# Patient Record
Sex: FEMALE | Race: WHITE | NOT HISPANIC OR LATINO
[De-identification: names, ages, dates, MRNs, and addresses within clinical notes are randomized per-mention and may not be internally consistent; named-entity substitution may affect disease eponyms.]

---

## 2017-03-03 ENCOUNTER — APPOINTMENT (OUTPATIENT)
Dept: RHEUMATOLOGY | Facility: CLINIC | Age: 52
End: 2017-03-03

## 2017-06-16 ENCOUNTER — APPOINTMENT (OUTPATIENT)
Dept: RHEUMATOLOGY | Facility: CLINIC | Age: 52
End: 2017-06-16

## 2017-06-16 VITALS
DIASTOLIC BLOOD PRESSURE: 82 MMHG | OXYGEN SATURATION: 98 % | TEMPERATURE: 98.6 F | HEART RATE: 72 BPM | HEIGHT: 63 IN | WEIGHT: 151 LBS | SYSTOLIC BLOOD PRESSURE: 136 MMHG | BODY MASS INDEX: 26.75 KG/M2

## 2017-07-28 ENCOUNTER — APPOINTMENT (OUTPATIENT)
Dept: INTERNAL MEDICINE | Facility: CLINIC | Age: 52
End: 2017-07-28
Payer: COMMERCIAL

## 2017-07-28 VITALS
OXYGEN SATURATION: 95 % | HEIGHT: 63 IN | RESPIRATION RATE: 18 BRPM | BODY MASS INDEX: 26.22 KG/M2 | TEMPERATURE: 98.4 F | WEIGHT: 148 LBS | SYSTOLIC BLOOD PRESSURE: 144 MMHG | HEART RATE: 76 BPM | DIASTOLIC BLOOD PRESSURE: 76 MMHG

## 2017-07-28 PROCEDURE — 99204 OFFICE O/P NEW MOD 45 MIN: CPT | Mod: 25

## 2017-07-28 PROCEDURE — 94060 EVALUATION OF WHEEZING: CPT

## 2017-07-28 PROCEDURE — 94727 GAS DIL/WSHOT DETER LNG VOL: CPT

## 2017-07-28 PROCEDURE — ZZZZZ: CPT

## 2017-07-28 PROCEDURE — 94729 DIFFUSING CAPACITY: CPT

## 2017-09-27 ENCOUNTER — RX RENEWAL (OUTPATIENT)
Age: 52
End: 2017-09-27

## 2017-11-17 ENCOUNTER — APPOINTMENT (OUTPATIENT)
Dept: RHEUMATOLOGY | Facility: CLINIC | Age: 52
End: 2017-11-17
Payer: COMMERCIAL

## 2017-11-17 VITALS
HEART RATE: 76 BPM | HEIGHT: 63 IN | SYSTOLIC BLOOD PRESSURE: 151 MMHG | WEIGHT: 152 LBS | BODY MASS INDEX: 26.93 KG/M2 | TEMPERATURE: 98.6 F | DIASTOLIC BLOOD PRESSURE: 93 MMHG | OXYGEN SATURATION: 97 %

## 2017-11-17 VITALS — DIASTOLIC BLOOD PRESSURE: 78 MMHG | SYSTOLIC BLOOD PRESSURE: 116 MMHG

## 2017-11-17 PROCEDURE — 99214 OFFICE O/P EST MOD 30 MIN: CPT | Mod: 25

## 2017-11-17 PROCEDURE — G0008: CPT

## 2017-11-17 PROCEDURE — 90688 IIV4 VACCINE SPLT 0.5 ML IM: CPT

## 2017-11-17 RX ORDER — PREDNISONE 20 MG/1
20 TABLET ORAL DAILY
Qty: 60 | Refills: 1 | Status: DISCONTINUED | COMMUNITY
Start: 2017-08-01 | End: 2017-11-17

## 2017-11-17 RX ORDER — SULFAMETHOXAZOLE AND TRIMETHOPRIM 800; 160 MG/1; MG/1
800-160 TABLET ORAL DAILY
Qty: 10 | Refills: 1 | Status: DISCONTINUED | COMMUNITY
Start: 2017-08-01 | End: 2017-11-17

## 2018-04-18 ENCOUNTER — OTHER (OUTPATIENT)
Age: 53
End: 2018-04-18

## 2018-08-13 ENCOUNTER — APPOINTMENT (OUTPATIENT)
Dept: NEUROSURGERY | Facility: CLINIC | Age: 53
End: 2018-08-13
Payer: COMMERCIAL

## 2018-08-13 VITALS
DIASTOLIC BLOOD PRESSURE: 67 MMHG | SYSTOLIC BLOOD PRESSURE: 128 MMHG | HEIGHT: 63.39 IN | BODY MASS INDEX: 25.9 KG/M2 | RESPIRATION RATE: 18 BRPM | TEMPERATURE: 98.6 F | WEIGHT: 148 LBS | HEART RATE: 49 BPM

## 2018-08-13 DIAGNOSIS — E34.8 OTHER SPECIFIED ENDOCRINE DISORDERS: ICD-10-CM

## 2018-08-13 PROCEDURE — 99213 OFFICE O/P EST LOW 20 MIN: CPT

## 2018-08-13 RX ORDER — PREDNISONE 1 MG/1
1 TABLET ORAL
Qty: 360 | Refills: 3 | Status: DISCONTINUED | COMMUNITY
Start: 2017-11-17 | End: 2018-08-13

## 2018-08-13 RX ORDER — FLUTICASONE PROPIONATE AND SALMETEROL 50; 250 UG/1; UG/1
250-50 POWDER RESPIRATORY (INHALATION)
Qty: 5 | Refills: 0 | Status: DISCONTINUED | COMMUNITY
Start: 2017-07-28 | End: 2018-08-13

## 2018-09-10 ENCOUNTER — RX RENEWAL (OUTPATIENT)
Age: 53
End: 2018-09-10

## 2018-09-20 ENCOUNTER — APPOINTMENT (OUTPATIENT)
Dept: BREAST CENTER | Facility: CLINIC | Age: 53
End: 2018-09-20
Payer: COMMERCIAL

## 2018-09-20 VITALS
BODY MASS INDEX: 26.58 KG/M2 | HEART RATE: 67 BPM | SYSTOLIC BLOOD PRESSURE: 140 MMHG | DIASTOLIC BLOOD PRESSURE: 90 MMHG | WEIGHT: 150 LBS | HEIGHT: 63 IN

## 2018-09-20 DIAGNOSIS — Z78.9 OTHER SPECIFIED HEALTH STATUS: ICD-10-CM

## 2018-09-20 DIAGNOSIS — N64.52 NIPPLE DISCHARGE: ICD-10-CM

## 2018-09-20 DIAGNOSIS — Z80.52 FAMILY HISTORY OF MALIGNANT NEOPLASM OF BLADDER: ICD-10-CM

## 2018-09-20 DIAGNOSIS — Z83.3 FAMILY HISTORY OF DIABETES MELLITUS: ICD-10-CM

## 2018-09-20 DIAGNOSIS — Z86.59 PERSONAL HISTORY OF OTHER MENTAL AND BEHAVIORAL DISORDERS: ICD-10-CM

## 2018-09-20 DIAGNOSIS — Z87.39 PERSONAL HISTORY OF OTHER DISEASES OF THE MUSCULOSKELETAL SYSTEM AND CONNECTIVE TISSUE: ICD-10-CM

## 2018-09-20 DIAGNOSIS — Z83.79 FAMILY HISTORY OF OTHER DISEASES OF THE DIGESTIVE SYSTEM: ICD-10-CM

## 2018-09-20 DIAGNOSIS — Z80.3 FAMILY HISTORY OF MALIGNANT NEOPLASM OF BREAST: ICD-10-CM

## 2018-09-20 DIAGNOSIS — Z80.0 FAMILY HISTORY OF MALIGNANT NEOPLASM OF DIGESTIVE ORGANS: ICD-10-CM

## 2018-09-20 PROCEDURE — 99243 OFF/OP CNSLTJ NEW/EST LOW 30: CPT

## 2018-09-21 PROBLEM — Z78.9 CAFFEINE USE: Status: ACTIVE | Noted: 2018-09-20

## 2018-09-21 PROBLEM — Z83.3 FAMILY HISTORY OF DIABETES MELLITUS: Status: ACTIVE | Noted: 2018-09-20

## 2018-09-21 PROBLEM — Z83.79 FAMILY HISTORY OF ULCERATIVE COLITIS: Status: ACTIVE | Noted: 2018-09-20

## 2018-09-21 PROBLEM — Z80.52 FAMILY HISTORY OF MALIGNANT NEOPLASM OF URINARY BLADDER: Status: ACTIVE | Noted: 2018-09-20

## 2018-09-21 PROBLEM — Z80.0 FAMILY HISTORY OF COLON CANCER: Status: ACTIVE | Noted: 2018-09-20

## 2018-09-21 PROBLEM — Z86.59 HISTORY OF DEPRESSION: Status: RESOLVED | Noted: 2018-09-20 | Resolved: 2018-09-21

## 2018-09-21 PROBLEM — Z80.3 FAMILY HISTORY OF BREAST CANCER: Status: ACTIVE | Noted: 2018-09-20

## 2018-09-21 PROBLEM — Z87.39 HISTORY OF SYSTEMIC LUPUS ERYTHEMATOSUS (SLE): Status: RESOLVED | Noted: 2018-09-20 | Resolved: 2018-09-21

## 2018-11-02 ENCOUNTER — RX RENEWAL (OUTPATIENT)
Age: 53
End: 2018-11-02

## 2018-12-06 ENCOUNTER — APPOINTMENT (OUTPATIENT)
Dept: BREAST CENTER | Facility: CLINIC | Age: 53
End: 2018-12-06
Payer: COMMERCIAL

## 2018-12-06 PROCEDURE — 99214 OFFICE O/P EST MOD 30 MIN: CPT

## 2018-12-11 ENCOUNTER — OUTPATIENT (OUTPATIENT)
Dept: OUTPATIENT SERVICES | Facility: HOSPITAL | Age: 53
LOS: 1 days | End: 2018-12-11

## 2018-12-11 DIAGNOSIS — Z00.8 ENCOUNTER FOR OTHER GENERAL EXAMINATION: ICD-10-CM

## 2018-12-12 ENCOUNTER — OUTPATIENT (OUTPATIENT)
Dept: OUTPATIENT SERVICES | Facility: HOSPITAL | Age: 53
LOS: 1 days | Discharge: HOME | End: 2018-12-12
Payer: COMMERCIAL

## 2018-12-12 ENCOUNTER — RESULT REVIEW (OUTPATIENT)
Age: 53
End: 2018-12-12

## 2018-12-12 DIAGNOSIS — D24.1 BENIGN NEOPLASM OF RIGHT BREAST: ICD-10-CM

## 2018-12-12 PROCEDURE — 88321 CONSLTJ&REPRT SLD PREP ELSWR: CPT

## 2018-12-18 LAB — SURGICAL PATHOLOGY FINAL REPORT - CH: SIGNIFICANT CHANGE UP

## 2019-01-27 ENCOUNTER — FORM ENCOUNTER (OUTPATIENT)
Age: 54
End: 2019-01-27

## 2019-01-28 ENCOUNTER — APPOINTMENT (OUTPATIENT)
Dept: MAMMOGRAPHY | Facility: CLINIC | Age: 54
End: 2019-01-28
Payer: COMMERCIAL

## 2019-01-28 ENCOUNTER — OUTPATIENT (OUTPATIENT)
Dept: OUTPATIENT SERVICES | Facility: HOSPITAL | Age: 54
LOS: 1 days | End: 2019-01-28
Payer: COMMERCIAL

## 2019-01-28 ENCOUNTER — APPOINTMENT (OUTPATIENT)
Dept: INTERNAL MEDICINE | Facility: CLINIC | Age: 54
End: 2019-01-28
Payer: COMMERCIAL

## 2019-01-28 VITALS
SYSTOLIC BLOOD PRESSURE: 120 MMHG | BODY MASS INDEX: 26.22 KG/M2 | RESPIRATION RATE: 16 BRPM | TEMPERATURE: 98.8 F | HEART RATE: 84 BPM | DIASTOLIC BLOOD PRESSURE: 66 MMHG | HEIGHT: 63 IN | OXYGEN SATURATION: 99 % | WEIGHT: 148 LBS

## 2019-01-28 DIAGNOSIS — Z00.8 ENCOUNTER FOR OTHER GENERAL EXAMINATION: ICD-10-CM

## 2019-01-28 DIAGNOSIS — R92.8 OTHER ABNORMAL AND INCONCLUSIVE FINDINGS ON DIAGNOSTIC IMAGING OF BREAST: ICD-10-CM

## 2019-01-28 PROCEDURE — 94729 DIFFUSING CAPACITY: CPT

## 2019-01-28 PROCEDURE — 99215 OFFICE O/P EST HI 40 MIN: CPT | Mod: 25

## 2019-01-28 PROCEDURE — 94060 EVALUATION OF WHEEZING: CPT

## 2019-01-28 PROCEDURE — C1739: CPT

## 2019-01-28 PROCEDURE — 19281 PERQ DEVICE BREAST 1ST IMAG: CPT

## 2019-01-28 PROCEDURE — 19281 PERQ DEVICE BREAST 1ST IMAG: CPT | Mod: RT

## 2019-01-28 PROCEDURE — ZZZZZ: CPT

## 2019-01-28 PROCEDURE — 94727 GAS DIL/WSHOT DETER LNG VOL: CPT

## 2019-01-28 NOTE — ASSESSMENT
[FreeTextEntry1] : Ms. Spann is scheduled for elective right breast papilloma excision. She has a significant smoking history, however, complete pulmonary function tests show no evidence of obstructive lung disease. A previous CT scan of chest shows mild upper lobe emphysematous changes. There is no contraindication to planned procedure with anesthesia/sedation. Patient will followup with me on a yearly basis.

## 2019-01-28 NOTE — HISTORY OF PRESENT ILLNESS
[FreeTextEntry1] : Patient presents for preoperative pulmonary evaluation for scheduled right breast lumpectomy. Patient was found to have an intraductal papilloma of the right breast. At that she has a 40+ pack year smoking history. Previous CT scans have demonstrated a 4 mm right lobe nodule which has been unchanged since 2012. Ms. Spann has some shortness of breath with exertion however she has no nocturnal symptoms of cough or dyspnea. She remains an active smoker. There is normal axis, anorexia or weight loss.

## 2019-01-28 NOTE — PROCEDURE
[FreeTextEntry1] : Complete pulmonary function tests show normal spirometry, lung volumes and flow volume loop. Diffusing capacity is normal.

## 2019-02-04 ENCOUNTER — MESSAGE (OUTPATIENT)
Age: 54
End: 2019-02-04

## 2019-02-04 NOTE — DATA REVIEWED
[FreeTextEntry1] : Mammogram:     4/26/18     Findings:  Negative\par \par Bilateral breast ultrasound:  4/26/18   Findings:  No findings in the right breast.  Stable 7 mm intrammary lymph node at 3:00 N10. \par \par Breast MRI:  10/13/18   Findings:  7 mm oval enhancing nodule at the 6:00 axis. \par \par MRI guided core biopsy Right breast 6:00:  11/9/18    PATHOLOGY:   Sclerosed intraductal papilloma.\par

## 2019-02-04 NOTE — HISTORY OF PRESENT ILLNESS
[FreeTextEntry1] : 53 year old female initially presented on 9/20/18 with a 1 year history of a right nipple discharge.\par Work up including mammography, ultrasound and breast MRI revealed a 7 mm enhancing nodule in the right retroareolar region.  MRI biopsy confirmed the diagnosis of an intraductal papilloma.  The patient is being admitted to [facility] for a right excisional breast biopsy with Radha localization.

## 2019-02-06 ENCOUNTER — RESULT REVIEW (OUTPATIENT)
Age: 54
End: 2019-02-06

## 2019-02-06 ENCOUNTER — OUTPATIENT (OUTPATIENT)
Dept: OUTPATIENT SERVICES | Facility: HOSPITAL | Age: 54
LOS: 1 days | Discharge: HOME | End: 2019-02-06
Payer: COMMERCIAL

## 2019-02-06 ENCOUNTER — APPOINTMENT (OUTPATIENT)
Dept: BREAST CENTER | Facility: HOSPITAL | Age: 54
End: 2019-02-06
Payer: COMMERCIAL

## 2019-02-06 VITALS
DIASTOLIC BLOOD PRESSURE: 81 MMHG | HEIGHT: 63 IN | OXYGEN SATURATION: 98 % | TEMPERATURE: 98 F | HEART RATE: 65 BPM | RESPIRATION RATE: 14 BRPM | WEIGHT: 150.36 LBS | SYSTOLIC BLOOD PRESSURE: 134 MMHG

## 2019-02-06 VITALS
RESPIRATION RATE: 14 BRPM | TEMPERATURE: 98 F | HEART RATE: 61 BPM | SYSTOLIC BLOOD PRESSURE: 102 MMHG | OXYGEN SATURATION: 96 % | DIASTOLIC BLOOD PRESSURE: 58 MMHG

## 2019-02-06 DIAGNOSIS — J34.2 DEVIATED NASAL SEPTUM: Chronic | ICD-10-CM

## 2019-02-06 PROCEDURE — 76098 X-RAY EXAM SURGICAL SPECIMEN: CPT | Mod: 26

## 2019-02-06 PROCEDURE — 19125 EXCISION BREAST LESION: CPT

## 2019-02-06 PROCEDURE — 88307 TISSUE EXAM BY PATHOLOGIST: CPT | Mod: 26

## 2019-02-06 RX ORDER — IODOQUINOL, HYDROCORTISONE ACETATE AND ALOE VERA LEAF 10; 20; 10 MG/G; MG/G; MG/G
1 GEL TOPICAL
Qty: 0 | Refills: 0 | COMMUNITY

## 2019-02-06 RX ORDER — TRAZODONE HCL 50 MG
0 TABLET ORAL
Qty: 0 | Refills: 0 | COMMUNITY

## 2019-02-06 RX ORDER — VENLAFAXINE HCL 75 MG
1 CAPSULE, EXT RELEASE 24 HR ORAL
Qty: 0 | Refills: 0 | COMMUNITY

## 2019-02-06 RX ORDER — SODIUM CHLORIDE 9 MG/ML
1000 INJECTION, SOLUTION INTRAVENOUS
Qty: 0 | Refills: 0 | Status: DISCONTINUED | OUTPATIENT
Start: 2019-02-06 | End: 2019-02-06

## 2019-02-06 RX ORDER — MYCOPHENOLATE MOFETIL 250 MG/1
1000 CAPSULE ORAL
Qty: 0 | Refills: 0 | COMMUNITY

## 2019-02-06 RX ORDER — FENTANYL CITRATE 50 UG/ML
50 INJECTION INTRAVENOUS
Qty: 0 | Refills: 0 | Status: DISCONTINUED | OUTPATIENT
Start: 2019-02-06 | End: 2019-02-06

## 2019-02-06 RX ORDER — OXYCODONE HYDROCHLORIDE 5 MG/1
10 TABLET ORAL ONCE
Qty: 0 | Refills: 0 | Status: DISCONTINUED | OUTPATIENT
Start: 2019-02-06 | End: 2019-02-06

## 2019-02-06 RX ORDER — OXYCODONE HYDROCHLORIDE 5 MG/1
1 TABLET ORAL
Qty: 20 | Refills: 0 | OUTPATIENT
Start: 2019-02-06

## 2019-02-06 RX ORDER — OXYCODONE HYDROCHLORIDE 5 MG/1
5 TABLET ORAL ONCE
Qty: 0 | Refills: 0 | Status: DISCONTINUED | OUTPATIENT
Start: 2019-02-06 | End: 2019-02-06

## 2019-02-06 RX ORDER — ONDANSETRON 8 MG/1
4 TABLET, FILM COATED ORAL ONCE
Qty: 0 | Refills: 0 | Status: DISCONTINUED | OUTPATIENT
Start: 2019-02-06 | End: 2019-02-06

## 2019-02-06 RX ORDER — L.ACIDOPH/B.ANIMALIS/B.LONGUM 15B CELL
1 CAPSULE ORAL
Qty: 0 | Refills: 0 | COMMUNITY

## 2019-02-06 RX ADMIN — OXYCODONE HYDROCHLORIDE 5 MILLIGRAM(S): 5 TABLET ORAL at 11:09

## 2019-02-06 NOTE — ASU PATIENT PROFILE, ADULT - PMH
COPD, severity to be determined    Intraductal cancer of right breast    Lupus (systemic lupus erythematosus)    Raynaud disease

## 2019-02-06 NOTE — ASU DISCHARGE PLAN (ADULT/PEDIATRIC). - MEDICATION SUMMARY - MEDICATIONS TO STOP TAKING
I will STOP taking the medications listed below when I get home from the hospital:    CellCept  -- 1000 milligram(s) by mouth once a day

## 2019-02-06 NOTE — ASU DISCHARGE PLAN (ADULT/PEDIATRIC). - MEDICATION SUMMARY - MEDICATIONS TO TAKE
I will START or STAY ON the medications listed below when I get home from the hospital:    predniSONE 5 mg oral tablet  -- 1 tab(s) by mouth once a day  -- Indication: For home med    oxyCODONE 5 mg oral tablet  -- 1 -2 tab(s) by mouth every 4 hours, As Needed -for moderate pain MDD:12  -- Caution federal law prohibits the transfer of this drug to any person other  than the person for whom it was prescribed.  It is very important that you take or use this exactly as directed.  Do not skip doses or discontinue unless directed by your doctor.  May cause drowsiness.  Alcohol may intensify this effect.  Use care when operating dangerous machinery.  This prescription cannot be refilled.  Using more of this medication than prescribed may cause serious breathing problems.    -- Indication: For home med    traZODone 100 mg oral tablet  -- orally once a day (at bedtime)  -- Indication: For home med    Effexor 75 mg oral tablet  -- 1 tab(s) by mouth once a day  -- Indication: For home med    Alcortin A 1%-2%-1% topical gel  -- Apply on skin to affected area once a day  -- Indication: For home med    Probiotic Formula oral capsule  -- 1 cap(s) by mouth once a day  -- Indication: For home med    Bactrim 400 mg-80 mg oral tablet  -- 1 tab(s) by mouth 2 times a day   -- Avoid prolonged or excessive exposure to direct and/or artificial sunlight while taking this medication.  Finish all this medication unless otherwise directed by prescriber.  Medication should be taken with plenty of water.    -- Indication: For home med    Calcium 500+D oral tablet, chewable  -- 1 tab(s) by mouth once a day  -- Indication: For home med

## 2019-02-06 NOTE — ASU PATIENT PROFILE, ADULT - TEACHING/LEARNING FACTORS INFLUENCE READINESS TO LEARN
Vaccine Information Statement(s) was given today. This has been reviewed, questions answered, and verbal consent given by Parent for injection(s) and administration of Haemophilus Influenza type b (Hib), Pediarix, Pneumococcal Conjugate (PCV13) and Rotavirus.      Patient tolerated without incident. See immunization grid for documentation.     depression

## 2019-02-06 NOTE — BRIEF OPERATIVE NOTE - PROCEDURE
<<-----Click on this checkbox to enter Procedure Excisional breast biopsy with radiologic localization  02/06/2019  Radha localizatiion  Active  JEANETH

## 2019-02-08 ENCOUNTER — APPOINTMENT (OUTPATIENT)
Dept: RHEUMATOLOGY | Facility: CLINIC | Age: 54
End: 2019-02-08
Payer: COMMERCIAL

## 2019-02-08 VITALS
BODY MASS INDEX: 26.75 KG/M2 | SYSTOLIC BLOOD PRESSURE: 136 MMHG | OXYGEN SATURATION: 99 % | HEIGHT: 63 IN | DIASTOLIC BLOOD PRESSURE: 84 MMHG | HEART RATE: 63 BPM | WEIGHT: 151 LBS

## 2019-02-08 PROCEDURE — 99214 OFFICE O/P EST MOD 30 MIN: CPT

## 2019-02-08 RX ORDER — MYCOPHENOLATE MOFETIL 500 MG/1
TABLET, FILM COATED ORAL
Refills: 0 | Status: DISCONTINUED | COMMUNITY
End: 2019-02-08

## 2019-02-08 RX ORDER — VENLAFAXINE HCL 50 MG
TABLET ORAL
Refills: 0 | Status: DISCONTINUED | COMMUNITY
End: 2019-02-08

## 2019-02-08 NOTE — DATA REVIEWED
[FreeTextEntry1] : Imaging: \par - updated echo 5/18 mild PAs 36\par - updated CTscan chest 4/18 mild emphysema and 0.4 cm nodule- both stable with no evidence of ILD\par - updated PFTs w/ DLCO excellent \par - dx w/ sclerosed intraductal papiloma presented w/ R nipple discharge 1 yr ago... lumpectomy few days ago, thus far doing well, awaiting path. \par HRCT 7/17/17 emphysema predominantly in apical/ paraseptal... mild peripheral pulm fibrosis w/ out honeycombing.. stable from 2016 study\par CTScan 5/21/16 stable emphysema w/ progressed ILD\par CTscan 9/14 stable emphysema w/ subtle fibrotic changes- stable, should be repeated\par Echo 3/13:  fine, repeat 3/15 stable\par PFTs 7/17 "aced" despite continued smoking 1PPD- 2PPD (not available for review)\par \par Brain MRI for severe transient HA neg 3/16

## 2019-02-08 NOTE — HISTORY OF PRESENT ILLNESS
[FreeTextEntry1] : 2/8/18\par - when feeling well, no physical limitations, no stiffness, clear cognition and rash controlled better 1500 mg MMF  and 8 mg pred...  was feeling well till scheduled for lumpectomy and tapered down to 5 mg pred... now... also off MMF for past 2 wks.  Joint pain, fatigue, and rash all considerably worse.  \par - updated echo 5/18 mild PAs 36\par - updated CTscan chest 4/18 mild emphysema and 0.4 cm nodule- both stable with no evidence of ILD\par - updated PFTs w/ DLCO excellent \par - dx w/ sclerosed intraductal papiloma presented w/ R nipple discharge 1 yr ago... lumpectomy few days ago, thus far doing well, awaiting path. \par - tapered down venlaxifine to 75 mg and tolerated well\par - Trazodone continues at 100-150 qhs\par - still smoking > PPD for past 30+ ys with no interest in stopping.  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

## 2019-02-08 NOTE — PHYSICAL EXAM
[General Appearance - Alert] : alert [General Appearance - In No Acute Distress] : in no acute distress [General Appearance - Well Nourished] : well nourished [General Appearance - Well Developed] : well developed [General Appearance - Well-Appearing] : healthy appearing [PERRL With Normal Accommodation] : pupils were equal in size, round, and reactive to light [Extraocular Movements] : extraocular movements were intact [Outer Ear] : the ears and nose were normal in appearance [Thyroid Diffuse Enlargement] : the thyroid was not enlarged [Thyroid Nodule] : there were no palpable thyroid nodules [Auscultation Breath Sounds / Voice Sounds] : lungs were clear to auscultation bilaterally [Heart Rate And Rhythm] : heart rate was normal and rhythm regular [Heart Sounds] : normal S1 and S2 [Heart Sounds Gallop] : no gallops [Murmurs] : no murmurs [Heart Sounds Pericardial Friction Rub] : no pericardial rub [Edema] : there was no peripheral edema [Cervical Lymph Nodes Enlarged Posterior Bilaterally] : posterior cervical [Cervical Lymph Nodes Enlarged Anterior Bilaterally] : anterior cervical [Supraclavicular Lymph Nodes Enlarged Bilaterally] : supraclavicular [Abnormal Walk] : normal gait [Nail Clubbing] : no clubbing  or cyanosis of the fingernails [Musculoskeletal - Swelling] : no joint swelling seen [Motor Tone] : muscle strength and tone were normal [Skin Color & Pigmentation] : normal skin color and pigmentation [Skin Turgor] : normal skin turgor [] : no rash [Sensation] : the sensory exam was normal to light touch and pinprick [Motor Exam] : the motor exam was normal [No Focal Deficits] : no focal deficits [Oriented To Time, Place, And Person] : oriented to person, place, and time [Impaired Insight] : insight and judgment were intact [Affect] : the affect was normal [Oropharynx] : the oropharynx was normal [No CVA Tenderness] : no ~M costovertebral angle tenderness [No Spinal Tenderness] : no spinal tenderness [FreeTextEntry1] :  calm, appropriate, pleasant PHQ score 9- mild

## 2019-02-08 NOTE — ASSESSMENT
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 Decreased to 5 mg in preparation for lumpectomy- and held MMF for past 10 days- noted return of rash already.  \par  Initially noted nausea w/ MMF and has resisted tapering off steroids.  Will increase MMF to 3 gm now and try to keep pred at 5 mg \par No myositis now or in past\par Most recent w/u CTScan chest 5/18 stable mild emphysema and 0.4 cm nodule but no ILD/ PFTs repeatedly nl with no obstructive/ restrictive change and echo 4/18 PAS 36. \par May have FLOR:  does snore... but no study.  If overwhelming fatigue persists may consider eval especially given otherwise good control. \par NOTE: \par - elixer MMF not better tolerated \par - Failed to control w/ HCQ alone on low dose steroids x many ys, added Benlysta, tapered off steroids but after 1 yr, refused to continue- was not necessarily better. \par 2014 With dx DM added MMF but never increased to effective dose as requested over ys \par \par 2) Depression/ 26 ys Clean and Sober:  intermittently struggles w/ feelings of depression but no suicidal ideations-better past yr and decreased venlaxifine to 75 mg once daily.\par Fatigue chronically, worse when SLE not fully controlled but may also have FLOR\par still in happy/ healthy relationship w/ strong social supports\par \par \par 3)CV risk: high given longstanding heavy smoker, chronic inflammatory state (SLE) and long term use Steroids w/ \par  HLD:  persistently elevated \par tchol 274\par Trigy  209\par HDL  60\par [id number]\par last year and again 4/17 was supposed to start statin, but did not. \par Noted plaque formation on carotid studies, echo suggested ischemic change but stress test neg.  Cath was not done and continues to deny palpitations, cp, sob, rosas... despite extensive review subtle sx in females.. still denies.\par Each visit discussed long standing smoking and has no interest in stopping  \par \par 4) Non compliance:  repeatedly as noted over years... f/u infrequent ov, failed to secure PCP- still - studies not done as requested. \par  Remain concerned about multiple risk factors for CVD, emphasized this today and last visit with pt and partner... but over time little changes.  \par Admits depression is a factor.. but doesn't feel this is completely the reason.  \par \par 5) Osteopenia: \par needs updated studies now repeatedly requested and again not done\par  \par Vaccination: \par - flushot 10/15, 10/17, 11/18 \par - Pneumonia 23 4 ys ago\par - Prevnar 13 needed\par \par Plan:\par - labs middle of March \par \par - increase Cellcept (MMF)  to 1500 mg twice daily, call if any issues tolerating this dose. Don't restart MMF till you see the surgeon. \par \par - labs 2-4 wks after starting.. \par \par - check with me for results of labs\par \par - need a primary care:  Call Catalina Clemens  [phone].  If you have trouble getting appt let me know \par \par - would like appt in June to followup ... Need to see how you are doing on the MMF\par . \par \par

## 2019-02-09 PROBLEM — D05.11 INTRADUCTAL CARCINOMA IN SITU OF RIGHT BREAST: Chronic | Status: ACTIVE | Noted: 2019-02-06

## 2019-02-09 PROBLEM — M32.9 SYSTEMIC LUPUS ERYTHEMATOSUS, UNSPECIFIED: Chronic | Status: ACTIVE | Noted: 2019-02-06

## 2019-02-09 PROBLEM — J44.9 CHRONIC OBSTRUCTIVE PULMONARY DISEASE, UNSPECIFIED: Chronic | Status: ACTIVE | Noted: 2019-02-06

## 2019-02-09 PROBLEM — I73.00 RAYNAUD'S SYNDROME WITHOUT GANGRENE: Chronic | Status: ACTIVE | Noted: 2019-02-06

## 2019-02-11 LAB — SURGICAL PATHOLOGY FINAL REPORT - CH: SIGNIFICANT CHANGE UP

## 2019-02-13 DIAGNOSIS — I73.00 RAYNAUD'S SYNDROME WITHOUT GANGRENE: ICD-10-CM

## 2019-02-13 DIAGNOSIS — Z88.0 ALLERGY STATUS TO PENICILLIN: ICD-10-CM

## 2019-02-13 DIAGNOSIS — D24.1 BENIGN NEOPLASM OF RIGHT BREAST: ICD-10-CM

## 2019-02-13 DIAGNOSIS — Z83.3 FAMILY HISTORY OF DIABETES MELLITUS: ICD-10-CM

## 2019-02-13 DIAGNOSIS — F10.21 ALCOHOL DEPENDENCE, IN REMISSION: ICD-10-CM

## 2019-02-13 DIAGNOSIS — F32.9 MAJOR DEPRESSIVE DISORDER, SINGLE EPISODE, UNSPECIFIED: ICD-10-CM

## 2019-02-13 DIAGNOSIS — Z80.0 FAMILY HISTORY OF MALIGNANT NEOPLASM OF DIGESTIVE ORGANS: ICD-10-CM

## 2019-02-13 DIAGNOSIS — N64.89 OTHER SPECIFIED DISORDERS OF BREAST: ICD-10-CM

## 2019-02-13 DIAGNOSIS — M32.9 SYSTEMIC LUPUS ERYTHEMATOSUS, UNSPECIFIED: ICD-10-CM

## 2019-02-13 DIAGNOSIS — F17.210 NICOTINE DEPENDENCE, CIGARETTES, UNCOMPLICATED: ICD-10-CM

## 2019-02-13 DIAGNOSIS — M85.80 OTHER SPECIFIED DISORDERS OF BONE DENSITY AND STRUCTURE, UNSPECIFIED SITE: ICD-10-CM

## 2019-02-13 DIAGNOSIS — J44.9 CHRONIC OBSTRUCTIVE PULMONARY DISEASE, UNSPECIFIED: ICD-10-CM

## 2019-02-13 DIAGNOSIS — Z80.3 FAMILY HISTORY OF MALIGNANT NEOPLASM OF BREAST: ICD-10-CM

## 2019-02-14 ENCOUNTER — APPOINTMENT (OUTPATIENT)
Dept: BREAST CENTER | Facility: CLINIC | Age: 54
End: 2019-02-14
Payer: COMMERCIAL

## 2019-02-14 DIAGNOSIS — D24.1 BENIGN NEOPLASM OF RIGHT BREAST: ICD-10-CM

## 2019-02-14 PROCEDURE — 99024 POSTOP FOLLOW-UP VISIT: CPT

## 2019-02-14 NOTE — CONSULT LETTER
[Dear  ___] : Dear ~EDWIN, [Courtesy Letter:] : I had the pleasure of seeing your patient, [unfilled], in my office today. [Please see my note below.] : Please see my note below. [Sincerely,] : Sincerely, [FreeTextEntry2] : Blu Villalta MD [FreeTextEntry3] : Kaitlin Reyes MD FACS\par  [DrAdelina  ___] : Dr. REID

## 2019-02-14 NOTE — HISTORY OF PRESENT ILLNESS
[FreeTextEntry1] : 53 year old female underwent  a right excisional breast biopsy with Radha localization  on 2/6/19 for an intraductal papilloma diagnosed on core biopsy. Additionally she had a right nipple discharge.

## 2019-02-14 NOTE — DATA REVIEWED
[FreeTextEntry1] : PATHOLOGY: 2/6/19  Findings: Right breast-  Previous biopsy site identified. No residual papilloma. No malignancy.

## 2019-07-10 ENCOUNTER — RX RENEWAL (OUTPATIENT)
Age: 54
End: 2019-07-10

## 2019-08-02 ENCOUNTER — RX RENEWAL (OUTPATIENT)
Age: 54
End: 2019-08-02

## 2019-09-03 ENCOUNTER — APPOINTMENT (OUTPATIENT)
Dept: RHEUMATOLOGY | Facility: CLINIC | Age: 54
End: 2019-09-03
Payer: COMMERCIAL

## 2019-09-03 VITALS
HEIGHT: 63 IN | BODY MASS INDEX: 25.16 KG/M2 | DIASTOLIC BLOOD PRESSURE: 78 MMHG | HEART RATE: 70 BPM | OXYGEN SATURATION: 98 % | RESPIRATION RATE: 16 BRPM | WEIGHT: 142 LBS | TEMPERATURE: 98.6 F | SYSTOLIC BLOOD PRESSURE: 130 MMHG

## 2019-09-03 DIAGNOSIS — Z71.6 TOBACCO ABUSE COUNSELING: ICD-10-CM

## 2019-09-03 PROCEDURE — 99214 OFFICE O/P EST MOD 30 MIN: CPT

## 2019-09-03 NOTE — HISTORY OF PRESENT ILLNESS
[FreeTextEntry1] : 9/3/19\par - MMF only increased to FS and increased prednisone 20 mg and decrease now to 15 mg and feeling well\par - trazodone 200 mg and finally sleeping better... less exhaustion recently... waking prior to the higher dose trazodone waking every 1-2 hs... \par - effexor 75 mg now for past 2 ys...\par - no joint inflammation, denies muscle weakness\par - mild rosas but quickly resolves, increase in productive / congested cough- light tan- intermittent wheezing lately... \par - new rash under breasts and in creases of thighs intermittently for past few ys in annular pattern and one new patch on underarm... chronic larger lesion on arm stable for many ys... facial rash  and cape distribution...   \par - updated echo 5/18 mild PAs 36... ideally needs repeat\par - updated CTscan chest 4/18 mild emphysema and 0.4 cm nodule- both stable with mild evidence of ILD\par - updated PFTs w/ DLCO excellent 1/19 Dr Villalta\par - breast lesions resolved... \par - still smoking > PPD for past 30+ ys with no interest in stopping.  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

## 2019-09-03 NOTE — DATA REVIEWED
[FreeTextEntry1] : Labs: \par 8/19 nl CBC, CMP, ESr, CK, CRP, SPEP, C3/4, TSH and neg dsDNA, vit D 67\par \par Imaging: \par - updated echo 5/18 mild PAs 36\par - updated CTscan chest 4/18 mild emphysema and 0.4 cm nodule- both stable with no evidence of ILD\par - updated PFTs w/ DLCO excellent \par - dx w/ sclerosed intraductal papiloma presented w/ R nipple discharge 1 yr ago... lumpectomy few days ago, thus far doing well, awaiting path. \par HRCT 7/17/17 emphysema predominantly in apical/ paraseptal... mild peripheral pulm fibrosis w/ out honeycombing.. stable from 2016 study\par CTScan 5/21/16 stable emphysema w/ progressed ILD\par CTscan 9/14 stable emphysema w/ subtle fibrotic changes- stable, should be repeated\par Echo 3/13:  fine, repeat 3/15 stable\par PFTs 7/17 "aced" despite continued smoking 1PPD- 2PPD (not available for review)\par \par Brain MRI for severe transient HA neg 3/16

## 2019-09-03 NOTE — PHYSICAL EXAM
[General Appearance - Alert] : alert [General Appearance - In No Acute Distress] : in no acute distress [General Appearance - Well Nourished] : well nourished [General Appearance - Well-Appearing] : healthy appearing [General Appearance - Well Developed] : well developed [Extraocular Movements] : extraocular movements were intact [PERRL With Normal Accommodation] : pupils were equal in size, round, and reactive to light [Outer Ear] : the ears and nose were normal in appearance [Oropharynx] : the oropharynx was normal [Auscultation Breath Sounds / Voice Sounds] : lungs were clear to auscultation bilaterally [Heart Rate And Rhythm] : heart rate was normal and rhythm regular [Heart Sounds] : normal S1 and S2 [Heart Sounds Gallop] : no gallops [Heart Sounds Pericardial Friction Rub] : no pericardial rub [Murmurs] : no murmurs [Edema] : there was no peripheral edema [Cervical Lymph Nodes Enlarged Posterior Bilaterally] : posterior cervical [Cervical Lymph Nodes Enlarged Anterior Bilaterally] : anterior cervical [No CVA Tenderness] : no ~M costovertebral angle tenderness [Supraclavicular Lymph Nodes Enlarged Bilaterally] : supraclavicular [No Spinal Tenderness] : no spinal tenderness [Abnormal Walk] : normal gait [Nail Clubbing] : no clubbing  or cyanosis of the fingernails [Musculoskeletal - Swelling] : no joint swelling seen [Motor Tone] : muscle strength and tone were normal [Skin Color & Pigmentation] : normal skin color and pigmentation [Skin Turgor] : normal skin turgor [] : no rash [Sensation] : the sensory exam was normal to light touch and pinprick [Motor Exam] : the motor exam was normal [Oriented To Time, Place, And Person] : oriented to person, place, and time [Affect] : the affect was normal [Impaired Insight] : insight and judgment were intact [FreeTextEntry1] :  calm, appropriate, pleasant feeling well today..

## 2019-09-03 NOTE — REVIEW OF SYSTEMS
[Red Eyes] : red eyes [Dry Eyes] : dryness of the eyes [Hoarseness] : hoarseness [Arthralgias] : arthralgias [Joint Pain] : joint pain [Skin Lesions] : skin lesion [Sleep Disturbances] : sleep disturbances [Depression] : depression [As Noted in HPI] : as noted in HPI [Negative] : Heme/Lymph [Wheezing] : wheezing [SOB on Exertion] : shortness of breath during exertion [Feeling Poorly] : not feeling poorly [Feeling Tired] : not feeling tired [Eye Pain] : no eye pain [Heartburn] : no heartburn [FreeTextEntry3] : stable- no visual disturbance [FreeTextEntry2] : fs MMF since 5/19 ... initially w/ significant nausea now fully resolved [FreeTextEntry7] : nausea w/ MMF better now [FreeTextEntry6] : mild CLOUD but no pleuritic cp- recent onset wheezing  [de-identified] : see HPI [FreeTextEntry9] : minimal lately  [de-identified] :  trouble sleeping without stable dose trazodone- better now on higher dose ... tapered down venlaxifine by 50% and doing well

## 2019-09-03 NOTE — ASSESSMENT
[FreeTextEntry1] : 55 yo wf with longstanding SLE/ DM sine myositis, HLD, Depression, recovering ETOH and recently dx w/ intraductal papilloma R breast s/p lumpectomy\par \par 1) SLE/ DM sine myositis: high titer MELLY, + SSA > 8,  dsDNA+ (intermittently) , + RNP in past, nothing + in past few ys, Bx + for SLE- ? discoid and/ or subacute cutaneous.  \par Disease activity- significant photosensitive rash on face dx + DM (2014- no bx), synovitis and overwhelming fatigue when active- has been fairly well controlled on 1500 mg MMF and 10 mg pred- tapered to low of 6 mg pred then again felt poorly- overwhelming fatigue most consistent sx in past years... severe at times- debilitating.  Increased after repeated requests - to fs MMF 5/19 but required 10-20 mg pred for past few months... now at 15 mg and finally feeling "well".  Again encourage now to try slow taper to lowest effective dose. \par - unclear etiology for 3 different rashes appreciated on exam today:  DM face/ chest, fungal vs. ScSLE and /or discoid lesions (R upper arm).  Needs to return to derm.  If found to be DLE/ ScSLE would consider adding HCQ if unable to taper off steroids.. may even consider entirely different tx... await results. \par No myositis now or in past\par Most recent w/u CTScan chest 5/18 stable mild emphysema and 0.4 cm nodule with mild evidence of  ILD/ but PFTs repeatedly nl with no obstructive/ restrictive change and echo 4/18 PAS 36. \par May have FLOR:  does snore... but no study.  If overwhelming fatigue persists may consider eval especially given otherwise good control. \par NOTE: \par - elixer MMF not better tolerated, nausea at times considerable \par - Failed to control w/ HCQ alone on low dose steroids x many ys, added Benlysta, tapered off steroids but after 1 yr, refused to continue- was not necessarily better. \par - 2014 seen by [facility]  for derm eval dx facial rash as DM but no bx.  \par - skin bx ys ago Dr. Perea + SLE no details available many ys ago \par \par 2) Depression/ 26 ys Clean and Sober:  intermittently struggles w/ feelings of depression but no suicidal ideations-better past yr and decreased venlaxifine to 75 mg once daily.\par Fatigue chronically, worse when SLE/ DM not fully controlled but may also have FLOR\par still in happy/ healthy relationship w/ strong social supports\par \par 3)CV risk: high given longstanding heavy smoker, chronic inflammatory state (SLE/DM) and long term use Steroids w/ \par  HLD:  persistently elevated \par tchol 274\par Trigy  209\par HDL  60\par [id number]\par last year and again 4/17 was supposed to start statin, but did not. ...\par Noted plaque formation on carotid studies, echo suggested ischemic change but stress test neg.  Cath was not done and continues to deny palpitations, cp, sob, rosas... despite extensive review subtle sx in females.. still denies.\par Each visit discussed long standing smoking and has no interest in stopping still   \par \par 4) Non compliance:  repeatedly as noted over years... f/u infrequent ov, failed to secure PCP- still - studies not done as requested. \par  Remain concerned about multiple risk factors for CVD, emphasized this today and last visit with pt and partner... but over time little changes.  \par Admits depression is a factor.. but doesn't feel this is completely the reason.  \par \par 5) Osteopenia: \par needs updated studies now repeatedly requested and again not done\par  \par Vaccination: \par - flushot 10/15, 10/17, 11/18 \par - Pneumonia 23 4 ys ago\par - Prevnar 13 needed\par \par Plan:\par - labs prior to next visit... may consider checking Kaye antibody\par \par - continue fs  Cellcept (MMF)  \par \par - need a primary care:  Call Catalina Clemens  [phone].  If you have trouble getting appt let me know still recommended \par \par - need derm eval send results of bx to me, call when completed.  Might need to change tx based on results\par \par - updated CTscan now chest. Also will need repeat PFTs w/ Dr. Villalta in Jan and should get updated Echo last done 4/18... orders are on file and will be mailed to you. Please do before 6 m visit\par \par - updated Dexa needed before next visit too\par \par - rto 6m, ideally every 3-4 m but too stressful... \par \par - as always, you should consider decreasing cigarette smoking.  \par \par - taper prednisone by 2.5 mg every 2 wks till off.. or use 1 mg and decrease by 1 mg every 2 wks once on less than 10 mg.  \par \par - ok to continue with 200 mg trazodone\par \par - if fatigue persists should consider sleep study... \par . \par \par

## 2019-10-30 ENCOUNTER — RX RENEWAL (OUTPATIENT)
Age: 54
End: 2019-10-30

## 2019-12-10 ENCOUNTER — RX RENEWAL (OUTPATIENT)
Age: 54
End: 2019-12-10

## 2019-12-24 ENCOUNTER — RX RENEWAL (OUTPATIENT)
Age: 54
End: 2019-12-24

## 2020-01-06 ENCOUNTER — RX RENEWAL (OUTPATIENT)
Age: 55
End: 2020-01-06

## 2020-01-31 ENCOUNTER — APPOINTMENT (OUTPATIENT)
Dept: RHEUMATOLOGY | Facility: CLINIC | Age: 55
End: 2020-01-31
Payer: COMMERCIAL

## 2020-01-31 ENCOUNTER — LABORATORY RESULT (OUTPATIENT)
Age: 55
End: 2020-01-31

## 2020-01-31 VITALS
SYSTOLIC BLOOD PRESSURE: 120 MMHG | OXYGEN SATURATION: 95 % | BODY MASS INDEX: 27.46 KG/M2 | DIASTOLIC BLOOD PRESSURE: 84 MMHG | HEIGHT: 63 IN | HEART RATE: 80 BPM | WEIGHT: 155 LBS | TEMPERATURE: 98.9 F

## 2020-01-31 PROCEDURE — 96372 THER/PROPH/DIAG INJ SC/IM: CPT | Mod: 59

## 2020-01-31 PROCEDURE — 36415 COLL VENOUS BLD VENIPUNCTURE: CPT

## 2020-01-31 PROCEDURE — 99214 OFFICE O/P EST MOD 30 MIN: CPT | Mod: 25

## 2020-01-31 PROCEDURE — 20610 DRAIN/INJ JOINT/BURSA W/O US: CPT | Mod: LT

## 2020-01-31 RX ORDER — KETOROLAC TROMETHAMINE 30 MG/ML
30 INJECTION, SOLUTION INTRAMUSCULAR; INTRAVENOUS
Qty: 1 | Refills: 0 | Status: COMPLETED | OUTPATIENT
Start: 2020-01-31

## 2020-01-31 RX ORDER — METHYLPRED ACET/NACL,ISO-OS/PF 80 MG/ML
80 VIAL (ML) INJECTION
Qty: 1 | Refills: 0 | Status: COMPLETED | OUTPATIENT
Start: 2020-01-31

## 2020-01-31 RX ADMIN — KETOROLAC TROMETHAMINE 0 MG/ML: 30 INJECTION, SOLUTION INTRAMUSCULAR; INTRAVENOUS at 00:00

## 2020-01-31 RX ADMIN — METHYLPREDNISOLONE ACETATE 0 MG/ML: 80 INJECTION, SUSPENSION INTRA-ARTICULAR; INTRALESIONAL; INTRAMUSCULAR; SOFT TISSUE at 00:00

## 2020-01-31 NOTE — PHYSICAL EXAM
[General Appearance - Alert] : alert [General Appearance - In No Acute Distress] : in no acute distress [General Appearance - Well Nourished] : well nourished [General Appearance - Well Developed] : well developed [General Appearance - Well-Appearing] : healthy appearing [Extraocular Movements] : extraocular movements were intact [PERRL With Normal Accommodation] : pupils were equal in size, round, and reactive to light [Outer Ear] : the ears and nose were normal in appearance [Oropharynx] : the oropharynx was normal [Auscultation Breath Sounds / Voice Sounds] : lungs were clear to auscultation bilaterally [Heart Rate And Rhythm] : heart rate was normal and rhythm regular [Heart Sounds Gallop] : no gallops [Heart Sounds] : normal S1 and S2 [Murmurs] : no murmurs [Heart Sounds Pericardial Friction Rub] : no pericardial rub [Edema] : there was no peripheral edema [Cervical Lymph Nodes Enlarged Posterior Bilaterally] : posterior cervical [Cervical Lymph Nodes Enlarged Anterior Bilaterally] : anterior cervical [No CVA Tenderness] : no ~M costovertebral angle tenderness [Supraclavicular Lymph Nodes Enlarged Bilaterally] : supraclavicular [No Spinal Tenderness] : no spinal tenderness [Abnormal Walk] : normal gait [Nail Clubbing] : no clubbing  or cyanosis of the fingernails [Musculoskeletal - Swelling] : no joint swelling seen [Skin Color & Pigmentation] : normal skin color and pigmentation [Motor Tone] : muscle strength and tone were normal [Skin Turgor] : normal skin turgor [] : no rash [Sensation] : the sensory exam was normal to light touch and pinprick [Motor Exam] : the motor exam was normal [Oriented To Time, Place, And Person] : oriented to person, place, and time [Impaired Insight] : insight and judgment were intact [Affect] : the affect was normal [Deep Tendon Reflexes (DTR)] : deep tendon reflexes were 2+ and symmetric [2+] : left 2+ [FreeTextEntry1] :  calm, appropriate, pleasant feeling well today..

## 2020-01-31 NOTE — HISTORY OF PRESENT ILLNESS
[FreeTextEntry1] : 1/31/20\par EMERGENCY visit for severe hand pain b/l and L arm pain\par - not associated w/ swelling, pain worse at work and upon waking.. no stiffness in hands but neck pain  and limited ROM\par - L lateral hip pain severe, can't lie on side.. \par - continues fs MMF now > 6 m, little change in rash but till recently was doing fairly well.. working FT, active... mild persistent chronic fatigue \par - did not go for another derm eval...or updated labs\par - still smoking > PPD for past 30+ ys with no interest in stopping.  Does not exercise\par - mild intermittent raynauds, significantly denies fevers, chills, lymphadenopathy, sicca, cp, sob/cough, weakness, dysphagia\par \par 1) SLE/ DM sine myositis: high titer MELLY, dsDNA+ (intermittently) , + RNP (repeat neg), SSA+ > 8  \par \par 2) Depression/ 27 ys Clean and Sober: \par \par 3) Health mnt:\par Smoking- active > 1 PPD x 30+ ys \par HLD:  persistently elevated \par tchol 274\par Trigy  209\par HDL  60\par [id number]\par last year and again 4/17 was supposed to start statin, but did not.

## 2020-01-31 NOTE — ASSESSMENT
[FreeTextEntry1] : 55 yo wf with longstanding SLE/ DM sine myositis, HLD, Depression, recovering ETOH and recently dx w/ intraductal papilloma R breast s/p lumpectomy\par \par 1) SLE/ DM sine myositis: high titer MELLY, + SSA > 8,  dsDNA+ (intermittently) , + RNP in past, nothing + in past few ys, Bx + for SLE- ? discoid and/ or subacute cutaneous.  \par Disease activity- significant photosensitive rash on face dx + DM (2014- no bx- Dr. Martin), synovitis and overwhelming fatigue when active- has been fairly well controlled on 3000 mg MMF and tapered pred to 5 mg for past 6 wk.. \par Presents today c/o hand pain but no evidence of synovitis.  Likely pain from C spine..\par - unclear etiology for 3 different rashes appreciated on exam today:  DM face/ chest, fungal vs. ScSLE and /or discoid lesions (R upper arm).  Needs to return to derm but not done... despite repeated requests.  \par If found to be DLE/ ScSLE would consider adding HCQ (again) if unable to taper off steroids.. may even consider entirely different tx... await results. \par No myositis now or in past\par Updated PFTs, CT chest and Echo 2019 all stable \par NOTE: \par - elixer MMF not better tolerated, nausea at times considerable \par - Failed to control w/ HCQ alone on low dose steroids x many ys, added Benlysta, tapered off steroids but after 1 yr, refused to continue- was not necessarily better. \par - 2014 seen by Say Phillips  for derm eval dx facial rash as DM but no bx.  \par - skin bx ys ago Dr. Perea + SLE no details available many ys ago \par \par 2) Depression/ 30 ys Clean and Sober:  intermittently struggles w/ feelings of depression but no suicidal ideations-better past yr and decreased venlaxifine to 75 mg once daily.\par Fatigue chronically, worse when SLE/ DM not fully controlled but may also have FLOR\par still in happy/ healthy relationship w/ strong social supports\par \par 3)CV risk: high given longstanding heavy smoker, chronic inflammatory state (SLE/DM) and long term use Steroids w/ \par  HLD:  persistently elevated \par tchol 274\par Trigy  209\par HDL  60\par [id number]\par last year and again 4/17 was supposed to start statin, but did not. ...\par Noted plaque formation on carotid studies, echo suggested ischemic change but stress test neg.  Cath was not done and continues to deny palpitations, cp, sob, rosas... despite extensive review subtle sx in females.. still denies.\par Each visit discussed long standing smoking and has no interest in stopping still   \par \par 4) Non compliance:  repeatedly as noted over years... f/u infrequent ov, failed to secure PCP- still - studies not done as requested. \par  Remain concerned about multiple risk factors for CVD, emphasized this today and last visit with pt and partner... but over time little changes.  \par Admits depression is a factor.. but doesn't feel this is completely the reason.  \par \par 5) Osteopenia: last study 10/15/20 w/ most severe T score -1.0 w/ 14% drop from previous study.. still low frax and not tx indicated. \par No hx fractures, previous tx \par Risk factors: long term steroid use, smoking\par \par 6) Cervical radiculopathy/ myofascial pain: obvious trigger point and hand pain b/l w /L arm pain moderate to severe, no tingling/ numbness just pain, not reproducible on exam-\par Will get baseline xrays of cervical spine and if not better w/ steroids/ muscle relaxants will get MRI\par \par 7) L GTB ++:  sudden onset past few wks, given IA injection today and toradol for relief \par  \par Vaccination: \par - annual flu vaccine \par - Pneumonia 23 4 ys ago\par - Prevnar 13 needed\par \par Plan:\par - labs prior to next visit... may consider checking Kaye antibody\par \par - continue fs  Cellcept (MMF)  \par \par - given IA injection to L GTB and IM toradol injection today \par \par - start tizanidine 4-8 mg daily at HS and if incomplete response to steroid injection today and arm / hand pain persists, ok to take 40 mg pred x 3 days.. if still incomplete response will get MRI Cspine.  Baseline Xrays now\par \par - need a primary care:  Call Catalina Clemens  [phone].  If you have trouble getting appt let me know still recommended \par \par - Prevnar 13 next visit\par \par - need derm eval send results of bx to me, call when completed.  Might need to change tx based on results\par \par - rto 6m, ideally every 3-4 m but too stressful... \par \par - as always, you should consider decreasing cigarette smoking.  \par \par - remain on 5 mg pred now, next visit will try to taper slowly by 1 mg every 1-3 m based on duration of use \par \par - ok to continue with 200 mg trazodone\par \par - if fatigue persists should consider sleep study... \par . \par \par

## 2020-01-31 NOTE — DATA REVIEWED
[FreeTextEntry1] : Labs: \par 8/19 nl CBC, CMP, ESr, CK, CRP, SPEP, C3/4, TSH and neg dsDNA, vit D 67\par \par Imaging: \par Echo 10/19 nl w/ RVSP 20 and nl otherwise \par CTScan chest 10/19  stable overall mild emphysema and 0.4 cm nodule with mild evidence of early fibrotic changes suggestive \par - updated echo 5/18 mild PAs 36\par - updated CTscan chest 4/18 mild emphysema and 0.4 cm nodule- both stable with no evidence of ILD\par - updated PFTs w/ DLCO excellent \par - dx w/ sclerosed intraductal papiloma presented w/ R nipple discharge 1 yr ago... lumpectomy few days ago, thus far doing well, awaiting path. \par HRCT 7/17/17 emphysema predominantly in apical/ paraseptal... mild peripheral pulm fibrosis w/ out honeycombing.. stable from 2016 study\par CTScan 5/21/16 stable emphysema w/ progressed ILD\par CTscan 9/14 stable emphysema w/ subtle fibrotic changes- stable, should be repeated\par Echo 3/13:  fine, repeat 3/15 stable\par PFTs 7/17 "aced" despite continued smoking 1PPD- 2PPD (not available for review)\par \par Brain MRI for severe transient HA neg 3/16

## 2020-01-31 NOTE — REVIEW OF SYSTEMS
[Red Eyes] : red eyes [Dry Eyes] : dryness of the eyes [Hoarseness] : hoarseness [Arthralgias] : arthralgias [Joint Pain] : joint pain [Skin Lesions] : skin lesion [Sleep Disturbances] : sleep disturbances [Depression] : depression [As Noted in HPI] : as noted in HPI [Negative] : Endocrine [Feeling Poorly] : not feeling poorly [Feeling Tired] : not feeling tired [Eye Pain] : no eye pain [Wheezing] : no wheezing [SOB on Exertion] : no shortness of breath during exertion [Heartburn] : no heartburn [FreeTextEntry2] : fatigue- chronic  [FreeTextEntry3] : stable- no visual disturbance [FreeTextEntry7] : nausea w/ MMF better now [FreeTextEntry9] :  hand pain and L arm, L lateral hip  [de-identified] : see HPI [de-identified] :  trouble sleeping without stable dose trazodone- better now on higher dose ... tapered down venlaxifine by 50% and doing well

## 2020-01-31 NOTE — PROCEDURE
[Today's Date:] : Date: [unfilled] [Risks] : risks [Benefits] : benefits [Alternatives] : alternatives [Consent Obtained] : written consent was obtained prior to the procedure and is detailed in the patient's record [Patient] : Prior to the start of the procedure a time out was taken and the identity of the patient was confirmed via name and date of birth with the patient. The correct site and the procedure to be performed were confirmed. The correct side was confirmed if applicable. The availability of the correct equipment was verified [Therapeutic] : therapeutic [#1 Site: ______] : #1 site identified in the [unfilled] [Ethyl Chloride] : ethyl chloride [___ ml Inj] : [unfilled] ~Uml [1%] : 1%  [Betadine] : betadine solution [Alcohol] : alcohol [25 gauge 1.5 inch] : A 25 gauge 1.5 inch needle was used [Depomedrol ___ mg] : Depomedrol [unfilled] mg [Tolerated Well] : the patient tolerated the procedure well [No Complications] : there were no complications [Instructions Given] : handouts/patient instructions were given to patient [Patient Instructed to Call] : patient was instructed to call if redness at site, a decrease in range of motion or an increase in pain is noted after procedure. [#2 Site: ___] : # 2 site identified in the [unfilled] [de-identified] : x2 [de-identified] : toradol 30 mg  [FreeTextEntry1] : ice routinely 20/20 for next 24 hs and call if pain not improved w/ in 72 or  if worsened joint pain, or signs of infection including fevers, chills, redness at site ensues.\par \par

## 2020-02-06 LAB
25(OH)D3 SERPL-MCNC: 60.7 NG/ML
ALBUMIN SERPL ELPH-MCNC: 5.1 G/DL
ALP BLD-CCNC: 70 U/L
ALT SERPL-CCNC: 14 U/L
ANION GAP SERPL CALC-SCNC: 14 MMOL/L
APPEARANCE: CLEAR
APTT IMM NP/PRE NP PPP: NORMAL SEC
APTT INV RATIO PPP: 30.1 SEC
AST SERPL-CCNC: 16 U/L
B2 GLYCOPROT1 IGA SERPL IA-ACNC: 5.4 SAU
BASOPHILS # BLD AUTO: 0.02 K/UL
BASOPHILS NFR BLD AUTO: 0.3 %
BILIRUB SERPL-MCNC: <0.2 MG/DL
BILIRUBIN URINE: NEGATIVE
BLOOD URINE: NEGATIVE
BUN SERPL-MCNC: 11 MG/DL
C3 SERPL-MCNC: 162 MG/DL
C4 SERPL-MCNC: 31 MG/DL
CALCIUM SERPL-MCNC: 9.8 MG/DL
CALCIUM SERPL-MCNC: 9.8 MG/DL
CARDIOLIPIN AB SER IA-ACNC: POSITIVE
CENTROMERE IGG SER-ACNC: <0.2 CD:130001892
CHLORIDE SERPL-SCNC: 101 MMOL/L
CHOLEST SERPL-MCNC: 246 MG/DL
CHOLEST/HDLC SERPL: 4.5 RATIO
CK SERPL-CCNC: 55 U/L
CO2 SERPL-SCNC: 26 MMOL/L
COLOR: NORMAL
CONFIRM: 31.2 SEC
CREAT SERPL-MCNC: 0.67 MG/DL
CREAT SPEC-SCNC: 55 MG/DL
CREAT/PROT UR: 0.2 RATIO
CRP SERPL-MCNC: 1.09 MG/DL
DRVVT IMM 1:2 NP PPP: NORMAL
DRVVT SCREEN TO CONFIRM RATIO: 1.1 RATIO
DSDNA AB SER-ACNC: <12 IU/ML
ENA JO1 AB SER IA-ACNC: <0.2 AL
ENA RNP AB SER IA-ACNC: <0.2 AL
ENA SM AB SER IA-ACNC: <0.2 AL
EOSINOPHIL # BLD AUTO: 0.05 K/UL
EOSINOPHIL NFR BLD AUTO: 0.9 %
ERYTHROCYTE [SEDIMENTATION RATE] IN BLOOD BY WESTERGREN METHOD: 44 MM/HR
FERRITIN SERPL-MCNC: 164 NG/ML
GLUCOSE QUALITATIVE U: NEGATIVE
GLUCOSE SERPL-MCNC: 94 MG/DL
HCT VFR BLD CALC: 45.4 %
HDLC SERPL-MCNC: 54 MG/DL
HGB BLD-MCNC: 14.6 G/DL
IMM GRANULOCYTES NFR BLD AUTO: 0.5 %
KETONES URINE: NEGATIVE
LDLC SERPL CALC-MCNC: 167 MG/DL
LEUKOCYTE ESTERASE URINE: NEGATIVE
LYMPHOCYTES # BLD AUTO: 1.09 K/UL
LYMPHOCYTES NFR BLD AUTO: 19.1 %
MAN DIFF?: NORMAL
MCHC RBC-ENTMCNC: 28.7 PG
MCHC RBC-ENTMCNC: 32.2 GM/DL
MCV RBC AUTO: 89.2 FL
MONOCYTES # BLD AUTO: 0.32 K/UL
MONOCYTES NFR BLD AUTO: 5.6 %
NEUTROPHILS # BLD AUTO: 4.21 K/UL
NEUTROPHILS NFR BLD AUTO: 73.6 %
NITRITE URINE: NEGATIVE
NPP NORMAL POOLED PLASMA: NORMAL SECS
PARATHYROID HORMONE INTACT: 24 PG/ML
PH URINE: 6
PLATELET # BLD AUTO: 237 K/UL
POTASSIUM SERPL-SCNC: 4.3 MMOL/L
PROT SERPL-MCNC: 7.4 G/DL
PROT UR-MCNC: 8 MG/DL
PROTEIN URINE: NEGATIVE
RBC # BLD: 5.09 M/UL
RBC # FLD: 12.9 %
SCREEN DRVVT: 37.9 SEC
SODIUM SERPL-SCNC: 141 MMOL/L
SPECIFIC GRAVITY URINE: 1.01
TRIGL SERPL-MCNC: 124 MG/DL
UROBILINOGEN URINE: NORMAL
WBC # FLD AUTO: 5.72 K/UL

## 2020-02-10 ENCOUNTER — TRANSCRIPTION ENCOUNTER (OUTPATIENT)
Age: 55
End: 2020-02-10

## 2020-03-03 ENCOUNTER — APPOINTMENT (OUTPATIENT)
Dept: RHEUMATOLOGY | Facility: CLINIC | Age: 55
End: 2020-03-03

## 2020-03-10 ENCOUNTER — APPOINTMENT (OUTPATIENT)
Dept: INTERNAL MEDICINE | Facility: CLINIC | Age: 55
End: 2020-03-10
Payer: COMMERCIAL

## 2020-03-10 ENCOUNTER — NON-APPOINTMENT (OUTPATIENT)
Age: 55
End: 2020-03-10

## 2020-03-10 ENCOUNTER — APPOINTMENT (OUTPATIENT)
Dept: RHEUMATOLOGY | Facility: CLINIC | Age: 55
End: 2020-03-10
Payer: COMMERCIAL

## 2020-03-10 VITALS
RESPIRATION RATE: 16 BRPM | SYSTOLIC BLOOD PRESSURE: 140 MMHG | WEIGHT: 156 LBS | OXYGEN SATURATION: 97 % | BODY MASS INDEX: 27.64 KG/M2 | DIASTOLIC BLOOD PRESSURE: 82 MMHG | HEART RATE: 70 BPM | TEMPERATURE: 99.1 F | HEIGHT: 63 IN

## 2020-03-10 VITALS
WEIGHT: 157 LBS | TEMPERATURE: 98.5 F | SYSTOLIC BLOOD PRESSURE: 140 MMHG | OXYGEN SATURATION: 98 % | HEART RATE: 72 BPM | BODY MASS INDEX: 27.81 KG/M2 | DIASTOLIC BLOOD PRESSURE: 88 MMHG

## 2020-03-10 DIAGNOSIS — M70.62 TROCHANTERIC BURSITIS, LEFT HIP: ICD-10-CM

## 2020-03-10 PROCEDURE — 99214 OFFICE O/P EST MOD 30 MIN: CPT

## 2020-03-10 PROCEDURE — 99214 OFFICE O/P EST MOD 30 MIN: CPT | Mod: 25

## 2020-03-10 PROCEDURE — 94010 BREATHING CAPACITY TEST: CPT

## 2020-03-10 NOTE — PLAN
[FreeTextEntry1] : Ms. Spann presents for evaluation with shortness of breath. She does have acute bronchospasm on physical examination. She will be given Symbicort 80/4.5 mcg 2 puffs b.i.d. x1 month. Patient is also given a prescription for an albuterol inhaler for rescue therapy. She will followup in 2 months with complete pulmonary function tests.

## 2020-03-10 NOTE — HISTORY OF PRESENT ILLNESS
[FreeTextEntry1] : Shortness of breath [de-identified] : Ms. Spann presents for evaluation complaining of shortness of breath. She has a history of COPD and lupus. She was recently at her rheumatologist's office and physical examination demonstrated bilateral expiratory rhonchi. Ms. pSann does not have metered dose inhaler. She does have a previous history of smoking and COPD. She is not on chronic maintenance therapy. Patient denies any fevers, chills or sweats. There is no recent travel.

## 2020-03-14 PROBLEM — M70.62 GREATER TROCHANTERIC BURSITIS OF LEFT HIP: Status: ACTIVE | Noted: 2020-01-31

## 2020-03-14 NOTE — ASSESSMENT
[FreeTextEntry1] : 56 yo wf with longstanding SLE/ DM sine myositis, HLD, Depression, recovering ETOH and recently dx w/ intraductal papilloma R breast s/p lumpectomy\par \par 1) SLE/ DM sine myositis: high titer MELLY, + SSA > 8,  dsDNA+ (intermittently) , + RNP in past, nothing + in past few ys, Bx + for SLE- ? discoid and/ or subacute cutaneous.  \par Disease activity- significant photosensitive rash on face dx + DM (2014- no bx- Dr. Martin), synovitis and overwhelming fatigue when active- has been fairly well controlled on 3000 mg MMF and tapered pred to 5 mg for past 6 wk..  recent increase for bronchitis... currently on 10 mg.  Always feels better even now with higher dose MMF on 10 mg pred \par If sx continue to be uncomfortable and again can't lower to 5 mg prednisone by summer will consider adding HCQ... (pt took self off when wasn't making difference)\par No myositis now or in past\par Updated PFTs, CT chest and Echo 2019 all stable \par NOTE: \par - elixer MMF not better tolerated, nausea at times considerable \par - Failed to control w/ HCQ alone on low dose steroids x many ys, added Benlysta, tapered off steroids but after 1 yr, refused to continue- was not necessarily better. \par - 2014 seen by Say Phillips  for derm eval dx facial rash as DM but no bx.  \par - skin bx ys ago Dr. Perea + SLE no details available many ys ago \par \par 2) Depression/ 30 ys Clean and Sober:  intermittently struggles w/ feelings of depression but no suicidal ideations-better past yr and decreased venlaxifine to 75 mg once daily.\par Fatigue chronically, worse when SLE/ DM not fully controlled but may also have FLOR\par still in happy/ healthy relationship w/ strong social supports\par \par 3)CV risk: high given longstanding heavy smoker, chronic inflammatory state (SLE/DM) and long term use Steroids w/ \par  HLD:  persistently elevated \par tchol 274\par Trigy  209\par HDL  60\par [id number]\par last year and again 4/17 was supposed to start statin, but did not. ...\par Noted plaque formation on carotid studies, echo suggested ischemic change but stress test neg.  Cath was not done and continues to deny palpitations, cp, sob, rosas... despite extensive review subtle sx in females.. still denies.\par Each visit discussed long standing smoking and has no interest in stopping still   \par \par 4) Non compliance:  repeatedly as noted over years... f/u infrequent ov, failed to secure PCP- still - studies not done as requested. \par  Remain concerned about multiple risk factors for CVD, emphasized this today and last visit with pt and partner... but over time little changes.  \par Admits depression is a factor.. but doesn't feel this is completely the reason.  \par \par 5) Osteopenia: last study 10/15/20 w/ most severe T score -1.0 w/ 14% drop from previous study.. still low frax and not tx indicated. \par No hx fractures, previous tx \par Risk factors: long term steroid use, smoking\par \par 6) Cervical radiculopathy/ myofascial pain: obvious trigger point and hand pain b/l w /L arm pain moderate to severe, no tingling/ numbness just pain, not reproducible on exam-\par Will get baseline xrays of cervical spine and if not better w/ steroids/ muscle relaxants will get MRI\par \par 7) L GTB ++:  sudden onset past few wks, given IA injection today and toradol for relief \par  \par Vaccination: \par - annual flu vaccine \par - Pneumonia 23 4 ys ago\par - Prevnar 13 needed\par \par Plan:\par - labs prior to next visit... may consider checking Kaye antibody, and anti Mi \par \par - continue fs  Cellcept (MMF)  \par \par - continue PRN tizanidine 4-8 mg daily at HS and if incomplete response to steroid injection today and arm / hand pain persists, ok to take 40 mg pred x 3 days.. if still incomplete response will get MRI Cspine.  Baseline Xrays now\par \par - again recommend need a primary care:  Call Catalina Clemens  [phone].  If you have trouble getting appt let me know still recommended \par \par - Prevnar 13 next visit!!!\par \par - need derm eval send results of bx to me, call when completed.  Might need to change tx based on results.. consider resumption of HCQ \par \par - rto 3-4 m but too stressful... \par \par - as always, you should consider decreasing cigarette smoking but not willing  to consider at this time.  \par \par - resume 5 mg pred now, next visit will try to taper slowly by 1 mg every 1-3 m based on duration of use \par \par - ok to continue with 200 mg trazodone\par \par - if fatigue persists should consider sleep study... have repeated this request \par . \par \par

## 2020-03-14 NOTE — HISTORY OF PRESENT ILLNESS
[FreeTextEntry1] : 3/10/20 \par - overall stable but anxious about CV19... worried about commuting to office and working in busy workplace\par - stable overall otherwise \par - excellent response to GTB injection on L Jan 2020\par - rash stable \par - did not go for another derm eval...or updated labs\par - still smoking > PPD for past 30+ ys with no interest in stopping.  Does not exercise\par - mild intermittent raynauds, significantly denies fevers, chills, lymphadenopathy, sicca, cp, sob/cough, weakness, dysphagia\par - increased pred this wk for recent bronchitis.. doing well, always feels better at this dose \par \par 1) SLE/ DM sine myositis: high titer MELLY, dsDNA+ (intermittently) , + RNP (repeat neg), SSA+ > 8  \par \par 2) Depression/ 27 ys Clean and Sober: \par \par 3) Health mnt:\par Smoking- active > 1 PPD x 30+ ys \par HLD:  persistently elevated \par tchol 274\par Trigy  209\par HDL  60\par [id number]\par last year and again 4/17 was supposed to start statin, but did not.

## 2020-03-14 NOTE — REVIEW OF SYSTEMS
[Feeling Poorly] : not feeling poorly [Feeling Tired] : not feeling tired [Eye Pain] : no eye pain [Red Eyes] : red eyes [Dry Eyes] : dryness of the eyes [Hoarseness] : hoarseness [Wheezing] : no wheezing [SOB on Exertion] : no shortness of breath during exertion [Heartburn] : no heartburn [Arthralgias] : arthralgias [Joint Pain] : joint pain [Skin Lesions] : skin lesion [Sleep Disturbances] : sleep disturbances [Depression] : depression [As Noted in HPI] : as noted in HPI [Negative] : Heme/Lymph [FreeTextEntry2] : fatigue- chronic  [FreeTextEntry3] : stable- no visual disturbance [FreeTextEntry7] : nausea w/ MMF better now [FreeTextEntry9] :  hand pain and L arm, L lateral hip  [de-identified] : see HPI [de-identified] :  trouble sleeping without stable dose trazodone- better now on higher dose ... tapered down venlaxifine by 50% and doing well

## 2020-03-14 NOTE — PHYSICAL EXAM
[General Appearance - Alert] : alert [General Appearance - In No Acute Distress] : in no acute distress [General Appearance - Well Nourished] : well nourished [General Appearance - Well Developed] : well developed [General Appearance - Well-Appearing] : healthy appearing [PERRL With Normal Accommodation] : pupils were equal in size, round, and reactive to light [Extraocular Movements] : extraocular movements were intact [Outer Ear] : the ears and nose were normal in appearance [Oropharynx] : the oropharynx was normal [Auscultation Breath Sounds / Voice Sounds] : lungs were clear to auscultation bilaterally [Heart Rate And Rhythm] : heart rate was normal and rhythm regular [Heart Sounds] : normal S1 and S2 [Heart Sounds Gallop] : no gallops [Murmurs] : no murmurs [Heart Sounds Pericardial Friction Rub] : no pericardial rub [Edema] : there was no peripheral edema [Cervical Lymph Nodes Enlarged Posterior Bilaterally] : posterior cervical [Cervical Lymph Nodes Enlarged Anterior Bilaterally] : anterior cervical [Supraclavicular Lymph Nodes Enlarged Bilaterally] : supraclavicular [No CVA Tenderness] : no ~M costovertebral angle tenderness [No Spinal Tenderness] : no spinal tenderness [Abnormal Walk] : normal gait [Nail Clubbing] : no clubbing  or cyanosis of the fingernails [Musculoskeletal - Swelling] : no joint swelling seen [Motor Tone] : muscle strength and tone were normal [Skin Color & Pigmentation] : normal skin color and pigmentation [Skin Turgor] : normal skin turgor [] : no rash [Deep Tendon Reflexes (DTR)] : deep tendon reflexes were 2+ and symmetric [Sensation] : the sensory exam was normal to light touch and pinprick [Motor Exam] : the motor exam was normal [2+] : left 2+ [Oriented To Time, Place, And Person] : oriented to person, place, and time [Impaired Insight] : insight and judgment were intact [Affect] : the affect was normal [FreeTextEntry1] :  calm, appropriate, pleasant feeling well today..

## 2020-08-04 ENCOUNTER — APPOINTMENT (OUTPATIENT)
Dept: RHEUMATOLOGY | Facility: CLINIC | Age: 55
End: 2020-08-04
Payer: COMMERCIAL

## 2020-08-04 DIAGNOSIS — Z00.00 ENCOUNTER FOR GENERAL ADULT MEDICAL EXAMINATION W/OUT ABNORMAL FINDINGS: ICD-10-CM

## 2020-08-04 PROCEDURE — 99214 OFFICE O/P EST MOD 30 MIN: CPT | Mod: 95

## 2020-08-04 NOTE — DATA REVIEWED
[FreeTextEntry1] : Labs: \par 1/30 neg Jo1, nl CK, ESR 44\par \par 8/19 nl CBC, CMP, ESr, CK, CRP, SPEP, C3/4, TSH and neg dsDNA, vit D 67\par \par Imaging: \par Echo 10/19 nl w/ RVSP 20 and nl otherwise \par CTScan chest 10/19  stable overall mild emphysema and 0.4 cm nodule with mild evidence of early fibrotic changes suggestive \par - updated echo 5/18 mild PAs 36\par - updated CTscan chest 4/18 mild emphysema and 0.4 cm nodule- both stable with no evidence of ILD\par - updated PFTs w/ DLCO excellent \par - dx w/ sclerosed intraductal papiloma presented w/ R nipple discharge 1 yr ago... lumpectomy few days ago, thus far doing well, awaiting path. \par HRCT 7/17/17 emphysema predominantly in apical/ paraseptal... mild peripheral pulm fibrosis w/ out honeycombing.. stable from 2016 study\par CTScan 5/21/16 stable emphysema w/ progressed ILD\par CTscan 9/14 stable emphysema w/ subtle fibrotic changes- stable, should be repeated\par Echo 3/13:  fine, repeat 3/15 stable\par PFTs 7/17 "aced" despite continued smoking 1PPD- 2PPD (not available for review)\par \par Brain MRI for severe transient HA neg 3/16

## 2020-08-04 NOTE — REVIEW OF SYSTEMS
[Dry Eyes] : dryness of the eyes [Red Eyes] : red eyes [Hoarseness] : hoarseness [Joint Pain] : joint pain [Arthralgias] : arthralgias [Skin Lesions] : skin lesion [Depression] : depression [As Noted in HPI] : as noted in HPI [Sleep Disturbances] : sleep disturbances [Negative] : Psychiatric [Feeling Poorly] : not feeling poorly [Feeling Tired] : not feeling tired [Wheezing] : no wheezing [Eye Pain] : no eye pain [SOB on Exertion] : no shortness of breath during exertion [Heartburn] : no heartburn [FreeTextEntry2] : fatigue- chronic  [FreeTextEntry3] : stable- no visual disturbance [FreeTextEntry7] : nausea w/ MMF better now [FreeTextEntry9] :  hand pain and L arm, L lateral hip  [de-identified] : see HPI [de-identified] :  trouble sleeping without stable dose trazodone- better now on higher dose ... tapered down venlaxifine by 50% and doing well

## 2020-08-04 NOTE — PHYSICAL EXAM
[General Appearance - Alert] : alert [General Appearance - In No Acute Distress] : in no acute distress [General Appearance - Well-Appearing] : healthy appearing [General Appearance - Well Developed] : well developed [General Appearance - Well Nourished] : well nourished [] : normal voice and communication [PERRL With Normal Accommodation] : pupils were equal in size, round, and reactive to light [Extraocular Movements] : extraocular movements were intact [2+] : left 2+ [Oriented To Time, Place, And Person] : oriented to person, place, and time [Affect] : the affect was normal [Impaired Insight] : insight and judgment were intact [FreeTextEntry1] :  calm, appropriate, pleasant feeling well today..

## 2020-08-04 NOTE — ASSESSMENT
[FreeTextEntry1] : 54 yo wf with longstanding SLE/ DM sine myositis, HLD, Depression, recovering ETOH and recently dx w/ intraductal papilloma R breast s/p lumpectomy\par \par 1) SLE/ DM sine myositis: high titer MELLY, + SSA > 8,  dsDNA+ (intermittently) , + RNP in past, nothing + in past few ys, Bx + for SLE- ? discoid and/ or subacute cutaneous.  \par Disease activity- significant photosensitive rash on face dx + DM (2014- no bx- Dr. Martin), synovitis and overwhelming fatigue when active- has been fairly well controlled on 3000 mg MMF and tapered pred to 5 mg but 3/20 experienced acute bronchitis- requiring high dose steorids and hasn't tapered down since, now at 13 mg and energy level good.  Again emphasized need to taper and may need to reconsider HCQ. \par No myositis now or in past, labs ok\par Updated PFTs, CT chest and Echo 2019 all stable \par NOTE: \par - elixer MMF not better tolerated, nausea at times considerable \par - Failed to control w/ HCQ alone on low dose steroids x many ys, added Benlysta, tapered off steroids but after 1 yr, refused to continue- was not necessarily better. \par - 2014 seen by Say Phillips  for derm eval dx facial rash as DM but no bx.  \par - skin bx ys ago Dr. Perea + SLE no details available many ys ago \par \par 2) Depression/ 30 ys Clean and Sober:  intermittently struggles w/ feelings of depression but no suicidal ideations-better past yr and decreased venlaxifine to 75 mg once daily.\par Fatigue chronically, worse when SLE/ DM not fully controlled but may also have FLOR\par still in happy/ healthy relationship w/ strong social supports\par \par 3)CV risk: high given longstanding heavy smoker, chronic inflammatory state (SLE/DM) and long term use Steroids w/ \par  HLD:  persistently elevated \par tchol 274\par Trigy  209\par HDL  60\par [id number]- updated orders now \par last year and again 4/17 was supposed to start statin, but did not. ...\par Noted plaque formation on carotid studies, echo suggested ischemic change but stress test neg.  Cath was not done and continues to deny palpitations, cp, sob, rosas... despite extensive review subtle sx in females.. still denies.\par Each visit discussed long standing smoking and has no interest in stopping still   \par \par 4) Non compliance:  repeatedly as noted over years... f/u infrequent ov, failed to secure PCP- still - studies not done as requested. \par  Remain concerned about multiple risk factors for CVD, emphasized this today and last visit with pt and partner... but over time little changes.  \par Admits depression is a factor.. but doesn't feel this is completely the reason.  \par \par 5) Osteopenia: last study 10/15/20 w/ most severe T score -1.0 w/ 14% drop from previous study.. still low frax and not tx indicated. \par No hx fractures, previous tx \par Risk factors: long term steroid use, smoking\par \par 6) Cervical radiculopathy/ myofascial pain: obvious trigger point and hand pain b/l w /L arm pain moderate to severe in past, now resolved.. \par  xrays 2/20 w/ cervical straightening  of cervical spine and if not better w/ steroids/ muscle relaxants will get MRI\par \par 7) L GTB ++:  sudden onset past few wks, given IA injection today and toradol for relief .. in past doing well now\par  \par Vaccination: \par - annual flu vaccine \par - Pneumonia 23 4 ys ago\par - Prevnar 13 needed\par \par Plan:\par - labs prior to next visit..\par \par - continue fs  Cellcept (MMF)  \par \par - continue PRN tizanidine 4-8 mg daily at HS and if incomplete response to steroid injection today and arm / hand pain persists, ok to take 40 mg pred x 3 days.. if still incomplete response will get MRI Cspine.  Baseline Xrays now\par \par - again recommend need a primary care:  Call Catalina Clemens  [phone].  If you have trouble getting appt let me know still recommended \par \par - Prevnar 13 next visit!!! still needed when in office \par \par - need derm eval send results of bx to me, call when completed.  Might need to change tx based on results.. consider resumption of HCQ \par \par - rto 3-4 m but too stressful... \par \par - as always, you should consider decreasing cigarette smoking but not willing  to consider at this time.  \par \par - slow taper pred 1 mg every 2 wks till on lowest effective dose, may need to add HCQ as repeatedly advised in past 6 m. \par \par - ok to continue with 200 mg trazodone\par \par - if fatigue persists should consider sleep study... have repeated this request \par \par - rto 3 m for RPA \par . \par \par

## 2020-08-04 NOTE — HISTORY OF PRESENT ILLNESS
[Medical Office: ([facility])___] : at the medical office located in  [Home] : at home, [unfilled] , at the time of the visit. [Verbal consent obtained from patient] : the patient, [unfilled] [FreeTextEntry1] : 8/4/20\par - again with another bronchitis... \par - continues to work from home.. \par - overall stable but hasn't tapered below to \par - no acute illness overall well.. \par - 3 gm MMF consistently and 13 mg pred hasn't decreased in past few weeks.. \par - rash stable \par - did not go for another derm eval...but does have new annular large scaly lesion on under surface of R arm...\par - still smoking > PPD for past 30+ ys with no interest in stopping.  Does not exercise\par - mild intermittent raynauds, significantly denies fevers, chills, lymphadenopathy, sicca, cp, sob/cough, weakness, dysphagia\par \par 1) SLE/ DM sine myositis: high titer MELLY, dsDNA+ (intermittently) , + RNP (repeat neg), SSA+ > 8  \par \par 2) Depression/ 27 ys Clean and Sober: \par \par 3) Health mnt:\par Smoking- active > 1 PPD x 30+ ys \par HLD:  persistently elevated \par tchol 274\par Trigy  209\par HDL  60\par [id number]\par last year and again 4/17 was supposed to start statin, but did not.

## 2020-10-15 ENCOUNTER — APPOINTMENT (OUTPATIENT)
Dept: RHEUMATOLOGY | Facility: CLINIC | Age: 55
End: 2020-10-15
Payer: COMMERCIAL

## 2020-10-15 ENCOUNTER — LABORATORY RESULT (OUTPATIENT)
Age: 55
End: 2020-10-15

## 2020-10-15 VITALS
WEIGHT: 150 LBS | HEART RATE: 94 BPM | SYSTOLIC BLOOD PRESSURE: 142 MMHG | DIASTOLIC BLOOD PRESSURE: 82 MMHG | TEMPERATURE: 97.9 F | BODY MASS INDEX: 26.57 KG/M2 | OXYGEN SATURATION: 96 %

## 2020-10-15 PROCEDURE — 99214 OFFICE O/P EST MOD 30 MIN: CPT | Mod: 25

## 2020-10-15 PROCEDURE — G0008: CPT

## 2020-10-15 PROCEDURE — 90686 IIV4 VACC NO PRSV 0.5 ML IM: CPT

## 2020-10-15 NOTE — PHYSICAL EXAM
[General Appearance - Alert] : alert [General Appearance - Well Nourished] : well nourished [General Appearance - In No Acute Distress] : in no acute distress [General Appearance - Well Developed] : well developed [General Appearance - Well-Appearing] : healthy appearing [PERRL With Normal Accommodation] : pupils were equal in size, round, and reactive to light [Extraocular Movements] : extraocular movements were intact [Impaired Insight] : insight and judgment were intact [Oriented To Time, Place, And Person] : oriented to person, place, and time [Affect] : the affect was normal [] : no respiratory distress [Auscultation Breath Sounds / Voice Sounds] : lungs were clear to auscultation bilaterally [Heart Rate And Rhythm] : heart rate was normal and rhythm regular [Heart Sounds] : normal S1 and S2 [Heart Sounds Gallop] : no gallops [Murmurs] : no murmurs [Heart Sounds Pericardial Friction Rub] : no pericardial rub [Arterial Pulses Carotid] : carotid pulses were normal with no bruits [Full Pulse] : the pedal pulses are present [Cervical Lymph Nodes Enlarged Posterior Bilaterally] : posterior cervical [Edema] : there was no peripheral edema [Cervical Lymph Nodes Enlarged Anterior Bilaterally] : anterior cervical [Supraclavicular Lymph Nodes Enlarged Bilaterally] : supraclavicular [Abnormal Walk] : normal gait [Nail Clubbing] : no clubbing  or cyanosis of the fingernails [Motor Tone] : muscle strength and tone were normal [Sensation] : the sensory exam was normal to light touch and pinprick [Motor Exam] : the motor exam was normal [FreeTextEntry1] :  calm, appropriate, pleasant feeling well today..

## 2020-10-15 NOTE — REVIEW OF SYSTEMS
[Red Eyes] : red eyes [Dry Eyes] : dryness of the eyes [Hoarseness] : hoarseness [Arthralgias] : arthralgias [Joint Pain] : joint pain [Skin Lesions] : skin lesion [Sleep Disturbances] : sleep disturbances [Depression] : depression [As Noted in HPI] : as noted in HPI [Negative] : Heme/Lymph [Feeling Poorly] : not feeling poorly [Feeling Tired] : not feeling tired [Eye Pain] : no eye pain [Wheezing] : wheezing [Cough] : cough [SOB on Exertion] : no shortness of breath during exertion [Heartburn] : no heartburn [FreeTextEntry2] : fatigue- chronic  [FreeTextEntry3] : stable- no visual disturbance [FreeTextEntry6] : mild intermittent [FreeTextEntry7] : nausea w/ MMF better now but still present [de-identified] : see HPI- facial rash, subacute cutaneous lesions on underside of R arm  [FreeTextEntry9] :  hand pain and L arm, L lateral hip  [de-identified] :  trouble sleeping without stable dose trazodone- better now on higher dose ... tapered down venlaxifine by 50% and doing well

## 2020-10-15 NOTE — PROCEDURE
[Today's Date:] : Date: [unfilled] [Benefits] : benefits [Risks] : risks [Alternatives] : alternatives [Consent Obtained] : written consent was obtained prior to the procedure and is detailed in the patient's record [Patient] : Prior to the start of the procedure a time out was taken and the identity of the patient was confirmed via name and date of birth with the patient. The correct site and the procedure to be performed were confirmed. The correct side was confirmed if applicable. The availability of the correct equipment was verified [Therapeutic] : therapeutic [#1 Site: ______] : #1 site identified in the [unfilled] [Alcohol] : alcohol [25 gauge 5/8  inch] : A 25 gauge 5/8 inch needle was used [No Complications] : there were no complications [Instructions Given] : handouts/patient instructions were given to patient [Patient Instructed to Call] : patient was instructed to call if redness at site, a decrease in range of motion or an increase in pain is noted after procedure. [de-identified] : fluarix  [FreeTextEntry1] : monitor for flulike sx or allergic rxn, take APAP as needed for first 3 days, if flulike sx persist call office\par \par \par

## 2020-10-15 NOTE — HISTORY OF PRESENT ILLNESS
[FreeTextEntry1] : 10/15/20\par - overall stablebut not convinced rash on face ever really got any better, lesions on arms persist- slightly worse large annular patch stable for many years\par - continues smoking unchanged, smoking > PPD for past 30+ ys with no interest in stopping.  Does not exercise, remains sober\par - needs updated appt w/ pulm- last full PFTs 1/19 ok.. intermittent use of symbicort..  \par - current dose prednisone 10 mg for joint pain.. decreased from 13 mg to 10 mg immediately notes increase in joint:  lateral hips, hands base thumbs and feet ankles stiffness noted.. throughout the day.. swelling noted across MCP.. R > L... Continues MMF \par had decreased to 5 mg last yr- but "felt miserable" .. \par \par 1) SLE/ DM sine myositis: high titer MELLY, dsDNA+ (intermittently) , + RNP (repeat neg), SSA+ > 8  \par \par 2) Depression/ 27 ys Clean and Sober: \par \par 3) Health mnt:\par Smoking- active > 1 PPD x 30+ ys \par HLD:  persistently elevated \par tchol 274\par Trigy  209\par HDL  60\par [id number]\par last year and again 4/17 was supposed to start statin, but did not.

## 2020-10-15 NOTE — ASSESSMENT
[FreeTextEntry1] : 56 yo wf with longstanding SLE/ DM sine myositis, HLD, Depression, recovering ETOH and recently dx w/ intraductal papilloma R breast s/p lumpectomy\par \par 1) SLE/ DM sine myositis: high titer MELLY, + SSA > 8,  dsDNA+ (intermittently) , + RNP in past, nothing + in past few ys, Bx + for SLE- ? discoid and/ or subacute cutaneous.  \par Disease activity- significant photosensitive rash on face dx + DM (2014- no bx- Dr. Martin), synovitis and overwhelming fatigue when active- had been fairly well controlled on 3000 mg MMF and tapered pred to 5 mg but 3/20 experienced acute bronchitis- requiring high dose steorids and hasn't resume anything less than 10 mg since.\par Today:  mild persistent joint pain in several PIPs, wrists  and rash still active both classic DM / and possible SLE.   Again emphasized need to taper and may need to reconsider HCQ, MTX, AZA or resumption of Benlysta.  Would like to review options w/ Dr. Martin... about options. \par No myositis now or in past, labs ok\par Updated PFTs, CT chest and Echo 2019 all stable \par NOTE: \par - elixer MMF not better tolerated, nausea at times considerable \par - Failed to control w/ HCQ alone on low dose steroids x many ys, added Benlysta, tapered off steroids but after 1 yr, refused to continue- was not necessarily better. \par - 2014 seen by Say Phillips  for derm eval dx facial rash as DM but no bx.  \par - skin bx ys ago Dr. Perea + SLE no details available many ys ago \par \par 2) Depression/ 30 ys Clean and Sober:  intermittently struggles w/ feelings of depression but no suicidal ideations-better past yr and decreased venlaxifine to 75 mg once daily.\par Fatigue chronically, worse when SLE/ DM not fully controlled but may also have FLOR\par still in happy/ healthy relationship w/ strong social supports\par \par 3)CV risk: high given longstanding heavy smoker, chronic inflammatory state (SLE/DM) and long term use Steroids w/ \par  HLD:  persistently elevated \par tchol 274\par Trigy  209\par HDL  60\par [id number]- updated orders now \par last year and again 4/17 was supposed to start statin, but did not. ...\par Noted plaque formation on carotid studies, echo suggested ischemic change but stress test neg.  Cath was not done and continues to deny palpitations, cp, sob, rosas... despite extensive review subtle sx in females.. still denies.\par Each visit discussed long standing smoking and has no interest in stopping still   \par \par 4) Non compliance:  repeatedly as noted over years... f/u infrequent ov, failed to secure PCP- still - studies not done as requested. \par  Remain concerned about multiple risk factors for CVD, emphasized this today and last visit with pt and partner... but over time little changes.  \par Admits depression is a factor.. but doesn't feel this is completely the reason.  \par \par 5) Osteopenia: last study 10/15/20 w/ most severe T score -1.0 w/ 14% drop from previous study.. still low frax and not tx indicated. \par No hx fractures, previous tx \par Risk factors: long term steroid use, smoking\par \par 6) Cervical radiculopathy/ myofascial pain: obvious trigger point and hand pain b/l w /L arm pain moderate to severe in past, now resolved.. \par  xrays 2/20 w/ cervical straightening  of cervical spine and if not better w/ steroids/ muscle relaxants will get MRI- all sx better with better\par \par 7) L GTB ++:  s/p IA injection and toradol last visit w/ ++ relief .. in past doing well now\par  \par Vaccination: \par - annual flu vaccine annually \par - Pneumonia 23 4 ys ago\par - Prevnar 13 needed\par \par Plan:\par - labs done today\par \par - continue fs  Cellcept (MMF)  but need to consider additional therapy, struggling to lower pred and rash continues.. need to review labs and then d/w Dr. Martin\par \par - continue PRN tizanidine 4-8 mg daily at HS and if incomplete response to steroid injection today and arm / hand pain persists, ok to take 40 mg pred x 3 days.. if still incomplete response will get MRI Cspine.  Baseline Xrays now\par \par - again recommend need a primary care:  Call Catalina Clemens  [phone].  If you have trouble getting appt let me know still recommended \par \par - Prevnar 13 next visit!!! still needed when in office \par \par - need derm eval again confirm R arm.. rash send results of bx to me, call when completed.\par \par - rto 3-4 m but too stressful... \par \par - as always, you should consider decreasing cigarette smoking but not willing  to consider at this time.  \par \par - slow taper pred 1 mg every 2 wks till on lowest effective dose, may need to add HCQ as repeatedly advised in past 6 m. \par \par - ok to continue with 200 mg trazodone\par \par - if fatigue persists should consider sleep study... have repeated this request \par \par - rto 3 m for RPA \par . \par \par

## 2020-10-19 LAB
25(OH)D3 SERPL-MCNC: 87.1 NG/ML
ALBUMIN SERPL ELPH-MCNC: 5 G/DL
ALP BLD-CCNC: 72 U/L
ALT SERPL-CCNC: 14 U/L
ANAPLASMA PHAGOCYTO IGM COMENT: NORMAL
ANAPLASMA PHAGOCYTO IGM COMMENT: NORMAL
ANAPLASMA PHAGOCYTOPHILIA IGG ANTIBODIES: NORMAL
ANAPLASMA PHAGOCYTOPHILIA IGM ANTIBODIES: NORMAL
ANION GAP SERPL CALC-SCNC: 15 MMOL/L
APPEARANCE: CLEAR
AST SERPL-CCNC: 14 U/L
B BURGDOR AB SER-IMP: NEGATIVE
B BURGDOR IGM PATRN SER IB-IMP: NEGATIVE
B BURGDOR18KD IGG SER QL IB: NORMAL
B BURGDOR23KD IGG SER QL IB: NORMAL
B BURGDOR23KD IGM SER QL IB: NORMAL
B BURGDOR28KD IGG SER QL IB: NORMAL
B BURGDOR30KD IGG SER QL IB: NORMAL
B BURGDOR31KD IGG SER QL IB: PRESENT
B BURGDOR39KD IGG SER QL IB: NORMAL
B BURGDOR39KD IGM SER QL IB: NORMAL
B BURGDOR41KD IGG SER QL IB: PRESENT
B BURGDOR41KD IGM SER QL IB: NORMAL
B BURGDOR45KD IGG SER QL IB: NORMAL
B BURGDOR58KD IGG SER QL IB: NORMAL
B BURGDOR66KD IGG SER QL IB: NORMAL
B BURGDOR93KD IGG SER QL IB: NORMAL
BASOPHILS # BLD AUTO: 0.05 K/UL
BASOPHILS NFR BLD AUTO: 0.6 %
BILIRUB SERPL-MCNC: 0.2 MG/DL
BILIRUBIN URINE: NEGATIVE
BLOOD URINE: NEGATIVE
BUN SERPL-MCNC: 15 MG/DL
C3 SERPL-MCNC: 153 MG/DL
C4 SERPL-MCNC: 31 MG/DL
CALCIUM SERPL-MCNC: 10.4 MG/DL
CALCIUM SERPL-MCNC: 10.4 MG/DL
CHLORIDE SERPL-SCNC: 100 MMOL/L
CK SERPL-CCNC: 53 U/L
CO2 SERPL-SCNC: 26 MMOL/L
COLOR: NORMAL
CREAT SERPL-MCNC: 0.7 MG/DL
CREAT SPEC-SCNC: 22 MG/DL
CREAT/PROT UR: 0.2 RATIO
CRP SERPL-MCNC: 0.62 MG/DL
DSDNA AB SER-ACNC: <12 IU/ML
EHRLICIA CHAFFEENIS IGG ANTIBODIES: NORMAL
EHRLICIA CHAFFEENIS IGG COMMENT: NORMAL
EHRLICIA CHAFFEENIS IGG INTERP: NORMAL
EHRLICIA CHAFFEENIS IGM ANTIBODIES: NORMAL
EOSINOPHIL # BLD AUTO: 0.06 K/UL
EOSINOPHIL NFR BLD AUTO: 0.7 %
ERYTHROCYTE [SEDIMENTATION RATE] IN BLOOD BY WESTERGREN METHOD: 26 MM/HR
ESTIMATED AVERAGE GLUCOSE: 120 MG/DL
GLUCOSE QUALITATIVE U: NEGATIVE
GLUCOSE SERPL-MCNC: 94 MG/DL
HBA1C MFR BLD HPLC: 5.8 %
HCT VFR BLD CALC: 49.5 %
HGB BLD-MCNC: 15.1 G/DL
IMM GRANULOCYTES NFR BLD AUTO: 0.2 %
KETONES URINE: NEGATIVE
LEUKOCYTE ESTERASE URINE: NEGATIVE
LYMPHOCYTES # BLD AUTO: 0.59 K/UL
LYMPHOCYTES NFR BLD AUTO: 6.8 %
MAN DIFF?: NORMAL
MCHC RBC-ENTMCNC: 28.7 PG
MCHC RBC-ENTMCNC: 30.5 GM/DL
MCV RBC AUTO: 93.9 FL
MONOCYTES # BLD AUTO: 0.32 K/UL
MONOCYTES NFR BLD AUTO: 3.7 %
NEUTROPHILS # BLD AUTO: 7.61 K/UL
NEUTROPHILS NFR BLD AUTO: 88 %
NITRITE URINE: NEGATIVE
PARATHYROID HORMONE INTACT: 14 PG/ML
PH URINE: 6.5
PLATELET # BLD AUTO: 292 K/UL
POTASSIUM SERPL-SCNC: 5.1 MMOL/L
PROT SERPL-MCNC: 7.2 G/DL
PROT UR-MCNC: 4 MG/DL
PROTEIN URINE: NEGATIVE
RBC # BLD: 5.27 M/UL
RBC # FLD: 13.5 %
SODIUM SERPL-SCNC: 141 MMOL/L
SPECIFIC GRAVITY URINE: 1.01
TSH SERPL-ACNC: 1.36 UIU/ML
UROBILINOGEN URINE: NORMAL
WBC # FLD AUTO: 8.65 K/UL

## 2020-10-20 LAB
B MICROTI AB SER QL: NORMAL
BABESIA ANTIBODIES, IGG: NORMAL
BABESIA ANTIBODIES, IGM: NORMAL

## 2020-10-29 ENCOUNTER — APPOINTMENT (OUTPATIENT)
Dept: RHEUMATOLOGY | Facility: CLINIC | Age: 55
End: 2020-10-29
Payer: COMMERCIAL

## 2020-10-29 LAB
ALBUMIN MFR SERPL ELPH: 58.9 %
ALBUMIN SERPL-MCNC: 4.2 G/DL
ALBUMIN/GLOB SERPL: 1.4 RATIO
ALPHA1 GLOB MFR SERPL ELPH: 4.7 %
ALPHA1 GLOB SERPL ELPH-MCNC: 0.3 G/DL
ALPHA2 GLOB MFR SERPL ELPH: 14.9 %
ALPHA2 GLOB SERPL ELPH-MCNC: 1.1 G/DL
B-GLOBULIN MFR SERPL ELPH: 9.9 %
B-GLOBULIN SERPL ELPH-MCNC: 0.7 G/DL
GAMMA GLOB FLD ELPH-MCNC: 0.8 G/DL
GAMMA GLOB MFR SERPL ELPH: 11.6 %
INTERPRETATION SERPL IEP-IMP: NORMAL
PROT SERPL-MCNC: 7.2 G/DL
PROT SERPL-MCNC: 7.2 G/DL

## 2020-10-29 PROCEDURE — 99441: CPT

## 2021-01-12 ENCOUNTER — APPOINTMENT (OUTPATIENT)
Dept: RHEUMATOLOGY | Facility: CLINIC | Age: 56
End: 2021-01-12
Payer: COMMERCIAL

## 2021-01-12 VITALS
HEIGHT: 63 IN | SYSTOLIC BLOOD PRESSURE: 140 MMHG | WEIGHT: 150 LBS | DIASTOLIC BLOOD PRESSURE: 70 MMHG | TEMPERATURE: 98 F | HEART RATE: 76 BPM | OXYGEN SATURATION: 97 % | BODY MASS INDEX: 26.58 KG/M2

## 2021-01-12 PROCEDURE — 20610 DRAIN/INJ JOINT/BURSA W/O US: CPT | Mod: RT

## 2021-01-12 PROCEDURE — 36415 COLL VENOUS BLD VENIPUNCTURE: CPT

## 2021-01-12 PROCEDURE — 99214 OFFICE O/P EST MOD 30 MIN: CPT | Mod: 25

## 2021-01-12 PROCEDURE — 99072 ADDL SUPL MATRL&STAF TM PHE: CPT

## 2021-01-12 RX ORDER — METHYLPRED ACET/NACL,ISO-OS/PF 80 MG/ML
80 VIAL (ML) INJECTION
Qty: 1 | Refills: 0 | Status: COMPLETED | OUTPATIENT
Start: 2021-01-12

## 2021-01-12 RX ORDER — UBIDECARENONE/VIT E ACET 100MG-5
CAPSULE ORAL
Refills: 0 | Status: DISCONTINUED | COMMUNITY
End: 2021-01-12

## 2021-01-12 RX ADMIN — METHYLPREDNISOLONE ACETATE 0 MG/ML: 80 INJECTION, SUSPENSION INTRA-ARTICULAR; INTRALESIONAL; INTRAMUSCULAR; SOFT TISSUE at 00:00

## 2021-01-12 NOTE — HISTORY OF PRESENT ILLNESS
[FreeTextEntry1] : 1/12/21 \par - labs 10/20 nl CK, ESR/ CRP, SPEP, CBC, CMP, C3/4,PTT, neg dsDNA and tick borne panel, w/ no proteinuria and Vit d 87.. stopped extra Vit D \par - lowered steroids to 7 mg noted increase in pain lateral and feet.. is exercising daily 1 mile  walking daily and stationary bike.. on FS MMF \par - considerable foot \par - continues to be stable... \par - overall considerably better without stress of commuting into city.. continues to work from home and feels this has helped overall condition considerably..    \par - still recognizes needs to stop smoking.. notes wheezing.. using symbicort once daily instead of ProAir... \par - R lateral hip pain severe at times, worse at HS, wakes from sound sleep.. intermittent \par . \par \par 1) SLE/ DM sine myositis: high titer MELLY, dsDNA+ (intermittently) , + RNP (repeat neg), SSA+ > 8  \par \par 2) Depression/ 27 ys Clean and Sober: \par \par 3) Health mnt:\par Smoking- active > 1 PPD x 30+ ys \par HLD:  persistently elevated \par tchol 274\par Trigy  209\par HDL  60\par [id number]\par last year and again 4/17 was supposed to start statin, but did not.

## 2021-01-12 NOTE — PHYSICAL EXAM
[General Appearance - Alert] : alert [General Appearance - In No Acute Distress] : in no acute distress [General Appearance - Well Nourished] : well nourished [General Appearance - Well Developed] : well developed [General Appearance - Well-Appearing] : healthy appearing [PERRL With Normal Accommodation] : pupils were equal in size, round, and reactive to light [Extraocular Movements] : extraocular movements were intact [] : no respiratory distress [Auscultation Breath Sounds / Voice Sounds] : lungs were clear to auscultation bilaterally [Heart Rate And Rhythm] : heart rate was normal and rhythm regular [Heart Sounds] : normal S1 and S2 [Heart Sounds Gallop] : no gallops [Murmurs] : no murmurs [Heart Sounds Pericardial Friction Rub] : no pericardial rub [Arterial Pulses Carotid] : carotid pulses were normal with no bruits [Full Pulse] : the pedal pulses are present [Edema] : there was no peripheral edema [Cervical Lymph Nodes Enlarged Posterior Bilaterally] : posterior cervical [Cervical Lymph Nodes Enlarged Anterior Bilaterally] : anterior cervical [Supraclavicular Lymph Nodes Enlarged Bilaterally] : supraclavicular [Abnormal Walk] : normal gait [Nail Clubbing] : no clubbing  or cyanosis of the fingernails [Motor Tone] : muscle strength and tone were normal [Sensation] : the sensory exam was normal to light touch and pinprick [Motor Exam] : the motor exam was normal [Oriented To Time, Place, And Person] : oriented to person, place, and time [Impaired Insight] : insight and judgment were intact [Affect] : the affect was normal [FreeTextEntry1] :  calm, appropriate, pleasant feeling well today..

## 2021-01-12 NOTE — PROCEDURE
[Today's Date:] : Date: [unfilled] [Risks] : risks [Benefits] : benefits [Alternatives] : alternatives [Consent Obtained] : written consent was obtained prior to the procedure and is detailed in the patient's record [Patient] : Prior to the start of the procedure a time out was taken and the identity of the patient was confirmed via name and date of birth with the patient. The correct site and the procedure to be performed were confirmed. The correct side was confirmed if applicable. The availability of the correct equipment was verified [Therapeutic] : therapeutic [#1 Site: ______] : #1 site identified in the [unfilled] [Ethyl Chloride] : ethyl chloride [___ ml Inj] : [unfilled] ~Uml [1%] : 1%  [Betadine] : betadine solution [Alcohol] : alcohol [25 gauge 1.5 inch] : A 25 gauge 1.5 inch needle was used [Depomedrol ___ mg] : Depomedrol [unfilled] mg [Tolerated Well] : the patient tolerated the procedure well [No Complications] : there were no complications [Instructions Given] : handouts/patient instructions were given to patient [Patient Instructed to Call] : patient was instructed to call if redness at site, a decrease in range of motion or an increase in pain is noted after procedure. [FreeTextEntry1] : ice routinely 20/20 for next 24 hs and call if pain not improved w/ in 72 or  if worsened joint pain, or signs of infection including fevers, chills, redness at site ensues.\par \par

## 2021-01-12 NOTE — REVIEW OF SYSTEMS
[Red Eyes] : red eyes [Dry Eyes] : dryness of the eyes [Hoarseness] : hoarseness [Wheezing] : wheezing [Cough] : cough [Arthralgias] : arthralgias [Joint Pain] : joint pain [Skin Lesions] : skin lesion [Sleep Disturbances] : sleep disturbances [Depression] : depression [As Noted in HPI] : as noted in HPI [Negative] : Heme/Lymph [Feeling Poorly] : not feeling poorly [Feeling Tired] : not feeling tired [Eye Pain] : no eye pain [SOB on Exertion] : no shortness of breath during exertion [Heartburn] : no heartburn [FreeTextEntry2] : fatigue- chronic  [FreeTextEntry6] : mild intermittent [FreeTextEntry3] : stable- no visual disturbance [FreeTextEntry7] : nausea w/ MMF better now but still present [FreeTextEntry9] :  hand pain and L arm, L lateral hip  [de-identified] : see HPI- facial rash, subacute cutaneous lesions on underside of R arm  [de-identified] :  trouble sleeping without stable dose trazodone- better now on higher dose ... tapered down venlaxifine by 50% and doing well

## 2021-01-12 NOTE — ASSESSMENT
[FreeTextEntry1] : 54 yo wf with longstanding SLE/ DM sine myositis, HLD, Depression, recovering ETOH and recently dx w/ intraductal papilloma R breast s/p lumpectomy\par \par 1) SLE/ DM sine myositis: high titer MELLY, + SSA > 8,  dsDNA+ (intermittently) , + RNP in past, nothing + in past few ys, Bx + for SLE- ? discoid and/ or subacute cutaneous and considerable increase in Raynauds (still smoking) now experiencing severe foot pain routinely in colder months .  \par Disease activity- significant photosensitive rash on face dx + DM (2014- no bx- Dr. Martin), synovitis and overwhelming fatigue when active- has been fairly well controlled on 3000 mg MMF and tapered pred to 7 mg, actually tolerating better than has in years.. but increase in raynauds as noted.. again emphasized need to stop smoking and at this point recommend CCB.. felodipine 2.5-5 mg daily needed, Reviewed R/ B/SE and understands, will call if any issues or concerns. \par No myositis now or in past, labs ok\par Updated PFTs, CT chest and Echo 2019 all stable, needs updated studies refer to Dr. Gonzalez\par NOTE: \par - elixer MMF not better tolerated, nausea at times considerable \par - Failed to control w/ HCQ alone on low dose steroids x many ys, added Benlysta, tapered off steroids but after 1 yr, refused to continue- was not necessarily better. \par - 2014 seen by Say Phillips  for derm eval dx facial rash as DM but no bx.  \par - skin bx ys ago Dr. Perea + SLE no details available many ys ago \par \par 2) Depression/ 30 ys Clean and Sober:  intermittently struggles w/ feelings of depression but no suicidal ideations-better past yr and decreased venlaxifine to 75 mg once daily.\par Fatigue chronically, worse when SLE/ DM not fully controlled but may also have FLOR\par still in happy/ healthy relationship w/ strong social supports\par \par 3)CV risk: high given longstanding heavy smoker, chronic inflammatory state (SLE/DM) and long term use Steroids w/ \par  HLD:  persistently elevated \par tchol 274\par Trigy  209\par HDL  60\par [id number]- updated orders now \par last year and again 4/17 was supposed to start statin, but did not. ...\par Noted plaque formation on carotid studies, echo suggested ischemic change but stress test neg.  Cath was not done and continues to deny palpitations, cp, sob, rosas... despite extensive review subtle sx in females.. still denies.\par Each visit discussed long standing smoking and has no interest in stopping still   \par \par 4) Non compliance:  repeatedly as noted over years... f/u infrequent ov, failed to secure PCP- still - studies not done as requested. \par  Remain concerned about multiple risk factors for CVD, emphasized this today and last visit with pt and partner... but over time little changes.  \par Admits depression is a factor.. but doesn't feel this is completely the reason.  \par \par 5) Osteopenia: last study 10/15/20 w/ most severe T score -1.0 w/ 14% drop from previous study.. still low frax and not tx indicated. \par No hx fractures, previous tx \par Risk factors: long term steroid use, smoking\par \par 6) Cervical radiculopathy/ myofascial pain: doing well lately, much better with less stress \par  xrays 2/20 w/ cervical straightening  of cervical spine and if not better w/ steroids/ muscle relaxants will get MRI- all sx better with better\par \par 7) RGTB severe today:  given IACS 80 mg now after excellent response on L last yr. \par 2019..  L GTB ++:  s/p IA injection and toradol last visit w/ ++ relief .. in past doing well now\par  \par Vaccination: \par - annual flu vaccine annually \par - Pneumonia 23 4 ys ago\par - Prevnar 13 needed\par \par Plan:\par - labs done today\par \par - continue fs  Cellcept (MMF)  but need to consider additional therapy, struggling to lower pred and rash continues.. need to review labs and then d/w Dr. Martin\par \par - continue PRN tizanidine 4-8 mg daily at HS and if incomplete response to steroid injection today and arm / hand pain persists, ok to take 40 mg pred x 3 days.. if still incomplete response will get MRI Cspine.  Baseline Xrays now\par \par - again recommend need a primary care:  Call Catalina Clemens  [phone].  If you have trouble getting appt let me know still recommended \par \par - Prevnar 13 next visit!!! still needed when in office \par \par - need derm eval again confirm R arm.. rash send results of bx to me, call when completed... looks like tinea corpis, will try topical antifungal nystatin ordered 3-4 x/ daily\par \par - Raynauds worse, try CCB felodipine 2.5-5 mg daily \par \par - recommend continued work from home, disease activiyt better controlled then ever!  On lowest dose steroids noted and tolerated in past 10 ys \par \par - rto 3-4 m \par \par - as always, you should consider decreasing cigarette smoking but not willing  to consider at this time- especially now with increase in raynauds sx  \par \par - slow taper pred 1 mg every 2 wks till on lowest effective dose, may need to add HCQ as repeatedly advised in past 6 m. \par \par - ok to continue with 200 mg trazodone\par \par - if fatigue persists should consider sleep study... have repeated this request. Make pulm appt, Dr. Gonzalez \par \par - rto 3 m for RPA \par . \par \par

## 2021-01-17 LAB
ALBUMIN SERPL ELPH-MCNC: 4.6 G/DL
ALP BLD-CCNC: 62 U/L
ALT SERPL-CCNC: 14 U/L
ANION GAP SERPL CALC-SCNC: 17 MMOL/L
APPEARANCE: CLEAR
AST SERPL-CCNC: 14 U/L
BASOPHILS # BLD AUTO: 0.04 K/UL
BASOPHILS NFR BLD AUTO: 0.7 %
BILIRUB SERPL-MCNC: 0.2 MG/DL
BILIRUBIN URINE: NEGATIVE
BLOOD URINE: NEGATIVE
BUN SERPL-MCNC: 10 MG/DL
C3 SERPL-MCNC: 132 MG/DL
C4 SERPL-MCNC: 27 MG/DL
CALCIUM SERPL-MCNC: 9.8 MG/DL
CHLORIDE SERPL-SCNC: 98 MMOL/L
CK SERPL-CCNC: 70 U/L
CO2 SERPL-SCNC: 20 MMOL/L
COLOR: COLORLESS
CREAT SERPL-MCNC: 0.75 MG/DL
CREAT SPEC-SCNC: 11 MG/DL
CREAT/PROT UR: 0.6 RATIO
CRP SERPL-MCNC: 0.23 MG/DL
DSDNA AB SER-ACNC: <12 IU/ML
EOSINOPHIL # BLD AUTO: 0.02 K/UL
EOSINOPHIL NFR BLD AUTO: 0.3 %
ERYTHROCYTE [SEDIMENTATION RATE] IN BLOOD BY WESTERGREN METHOD: 15 MM/HR
ESTIMATED AVERAGE GLUCOSE: 123 MG/DL
GLUCOSE QUALITATIVE U: NEGATIVE
GLUCOSE SERPL-MCNC: 88 MG/DL
HBA1C MFR BLD HPLC: 5.9 %
HCT VFR BLD CALC: 45.4 %
HGB BLD-MCNC: 13.9 G/DL
IMM GRANULOCYTES NFR BLD AUTO: 0.2 %
KETONES URINE: NEGATIVE
LEUKOCYTE ESTERASE URINE: NEGATIVE
LYMPHOCYTES # BLD AUTO: 0.56 K/UL
LYMPHOCYTES NFR BLD AUTO: 9.1 %
MAN DIFF?: NORMAL
MCHC RBC-ENTMCNC: 28.2 PG
MCHC RBC-ENTMCNC: 30.6 GM/DL
MCV RBC AUTO: 92.1 FL
MONOCYTES # BLD AUTO: 0.17 K/UL
MONOCYTES NFR BLD AUTO: 2.8 %
NEUTROPHILS # BLD AUTO: 5.35 K/UL
NEUTROPHILS NFR BLD AUTO: 86.9 %
NITRITE URINE: NEGATIVE
PH URINE: 7
PLATELET # BLD AUTO: 233 K/UL
POTASSIUM SERPL-SCNC: 4.6 MMOL/L
PROT SERPL-MCNC: 7 G/DL
PROT UR-MCNC: 6 MG/DL
PROTEIN URINE: NEGATIVE
RBC # BLD: 4.93 M/UL
RBC # FLD: 13.4 %
SODIUM SERPL-SCNC: 135 MMOL/L
SPECIFIC GRAVITY URINE: 1
TSH SERPL-ACNC: 1.16 UIU/ML
UROBILINOGEN URINE: NORMAL
WBC # FLD AUTO: 6.15 K/UL

## 2021-01-22 ENCOUNTER — APPOINTMENT (OUTPATIENT)
Dept: INTERNAL MEDICINE | Facility: CLINIC | Age: 56
End: 2021-01-22

## 2021-04-05 ENCOUNTER — NON-APPOINTMENT (OUTPATIENT)
Age: 56
End: 2021-04-05

## 2021-04-05 ENCOUNTER — TRANSCRIPTION ENCOUNTER (OUTPATIENT)
Age: 56
End: 2021-04-05

## 2021-04-08 ENCOUNTER — APPOINTMENT (OUTPATIENT)
Dept: RHEUMATOLOGY | Facility: CLINIC | Age: 56
End: 2021-04-08
Payer: COMMERCIAL

## 2021-04-08 VITALS
WEIGHT: 150 LBS | OXYGEN SATURATION: 96 % | DIASTOLIC BLOOD PRESSURE: 80 MMHG | BODY MASS INDEX: 26.58 KG/M2 | HEIGHT: 63 IN | TEMPERATURE: 96.8 F | HEART RATE: 77 BPM | SYSTOLIC BLOOD PRESSURE: 140 MMHG

## 2021-04-08 DIAGNOSIS — Z91.19 PATIENT'S NONCOMPLIANCE WITH OTHER MEDICAL TREATMENT AND REGIMEN: ICD-10-CM

## 2021-04-08 PROCEDURE — 36415 COLL VENOUS BLD VENIPUNCTURE: CPT

## 2021-04-08 PROCEDURE — 99214 OFFICE O/P EST MOD 30 MIN: CPT | Mod: 25

## 2021-04-08 PROCEDURE — 99072 ADDL SUPL MATRL&STAF TM PHE: CPT

## 2021-04-08 NOTE — REVIEW OF SYSTEMS
[Red Eyes] : red eyes [Dry Eyes] : dryness of the eyes [Hoarseness] : hoarseness [Wheezing] : wheezing [Cough] : cough [Arthralgias] : arthralgias [Joint Pain] : joint pain [Skin Lesions] : skin lesion [Sleep Disturbances] : sleep disturbances [Depression] : depression [As Noted in HPI] : as noted in HPI [Negative] : Heme/Lymph [Feeling Poorly] : not feeling poorly [Feeling Tired] : not feeling tired [Eye Pain] : no eye pain [SOB on Exertion] : no shortness of breath during exertion [Heartburn] : no heartburn [FreeTextEntry2] : fatigue- chronic  [FreeTextEntry3] : stable- no visual disturbance [FreeTextEntry6] : mild intermittent [FreeTextEntry7] : nausea w/ MMF better now but still present [FreeTextEntry9] :  hand pain and L arm, L lateral hip greatly improved now [de-identified] : see HPI- facial rash, subacute cutaneous lesions on underside of R arm  [de-identified] :  trouble sleeping without stable dose trazodone- better now on higher dose ... tapered down venlaxifine by 50% and doing well

## 2021-04-08 NOTE — ASSESSMENT
[FreeTextEntry1] : 55 yo wf with longstanding SLE/ DM sine myositis, HLD, Depression, recovering ETOH and recently dx w/ intraductal papilloma R breast s/p lumpectomy- no additional tx needed \par \par \par 1) SLE/ DM sine myositis: high titer MELLY, + SSA > 8,  dsDNA+ (intermittently) , + RNP in past, nothing + in past few ys, Bx + for SLE- ? discoid and/ or subacute cutaneous and considerable increase in Raynauds (still smoking)   \par Disease activity- significant photosensitive rash on face dx + DM (2014- no bx- Dr. Martin), synovitis and overwhelming fatigue when active- has been fairly well controlled on 3000 mg MMF and tapered pred to 7 mg, actually tolerating better than has in years.. but increase in raynauds and ongoing active rash.. \par Needs to return to derm, is there something more/ different we should consider.  Overall fairly stable for past several ys but have never controlled rash well and recently increased raynauds. \par Recommended felodipine but not c/w use and continues to smoke.\par No myositis now or in past, labs ok but last done 1/21, update needed now (did not repeat as recommended 2 m ago)\par Updated PFTs, CT chest and Echo 2019 all stable, needs updated studies refer to Dr. Gonzalez... still not done.  \par NOTE: \par - elixer MMF not better tolerated, nausea at times considerable \par - Failed to control w/ HCQ alone on low dose steroids x many ys, added Benlysta, tapered off steroids but after 1 yr, refused to continue- was not necessarily better. \par - 2014 seen by Say Phillips  for derm eval dx facial rash as DM but no bx.  \par - skin bx ys ago Dr. Perea + SLE no details available many ys ago \par \par 2) Depression/ 30+ ys Clean and Sober:  intermittently struggles w/ feelings of depression but no suicidal ideations-better past yr and decreased venlaxifine to 75 mg once daily, continues to have strong family support\par Fatigue chronically, worse when SLE/ DM not fully controlled but may also have FLOR (recommend pulm eval but not done)\par \par 3)CV risk: high given longstanding heavy smoker, chronic inflammatory state (SLE/DM) and long term use Steroids w/ \par - PreDM w/ HbA1c 5.9.. \par -  HLD:  persistently elevated, , TG  209, HDL  60, [id number]- updated orders now repeat.. \par last year and again 4/17 was supposed to start statin, but did not. ...hasn't seen PCP in past few ys despite repeated suggestions\par Noted plaque formation on carotid studies, echo suggested ischemic change but stress test neg.  Cath was not done and continues to deny palpitations, cp, sob, rosas... despite extensive review subtle sx in females.. still denies.\par - SmokingEach visit discussed long standing smoking and has no interest in stopping still   \par \par 4) Non compliance:  repeatedly as noted over years... f/u infrequent ov, failed to secure PCP- still - studies not done as requested. \par  Remain concerned about multiple risk factors for CVD, emphasized this today and last visit with pt and partner... but over time little changes.  \par Admits depression is a factor.. but doesn't feel this is completely the reason.  \par \par 5) Osteopenia: last study 10/15/20 w/ most severe T score -1.0 w/ 14% drop from previous study.. still low frax and not tx indicated. \par No hx fractures, previous tx \par Risk factors: long term steroid use, smoking\par \par 6) Cervical radiculopathy/ myofascial pain: doing well lately, much better with less stress \par  xrays 2/20 w/ cervical straightening  of cervical spine and if not better w/ steroids/ muscle relaxants will get MRI- all sx better with better\par \par 7) RGTB: in past  given IACS 80 mg 1/21 w/ excellent results, no return.  \par 2019..  L GTB ++:  s/p IA injection and toradol  w/ ++ relief .. in past doing well now\par  \par Vaccination: \par - annual flu vaccine annually \par - Pneumonia 23 4 ys ago\par - Prevnar 13 needed\par - Sars-Cov-2 Vaccine recommended.. now - no adjustment in MMF required\par \par \par \par Plan:\par - labs done today\par \par - continue fs  Cellcept (MMF)  but need to consider additional therapy, struggling to lower pred and rash continues.. need to review labs and then d/w Derm (Refer to Dr. Araiza's gp for 2nd opinion- evaluation of facial/ cape distribution and lesion on R upper arm)\par \par - continue PRN tizanidine 4-8 mg daily at HS and if incomplete response to steroid injection today and arm / hand pain persists, ok to take 40 mg pred x 3 days.. if still incomplete response will get MRI Cspine.  Baseline Xrays now\par \par - again recommend need a primary care:  Call Catalina Clemens  [phone].  If you have trouble getting appt let me know still recommended .. still \par \par - Prevnar 13 next visit!!! still needed when in office.. after complete Sars-Cov-2 Vaccine \par \par - Raynauds worse, try CCB felodipine 2.5-5 mg daily, if uncomfortable.. \par \par - recommend continued work from home, disease activity  better controlled then ever!  On lowest dose steroids noted and tolerated in past 10 ys \par \par - rto 3-4 m \par \par - as always, you should consider decreasing cigarette smoking but not willing  to consider at this time- especially now with increase in raynauds sx  \par \par - slow taper pred 1 mg every 2 wks till on lowest effective dose, may need to add HCQ as repeatedly advised in past 6 m. \par \par - ok to continue with 200 mg trazodone\par \par - if fatigue persists should consider sleep study... have repeated this request. Make pulm appt, Dr. Gonzalez \par \par . \par \par

## 2021-04-08 NOTE — HISTORY OF PRESENT ILLNESS
[FreeTextEntry1] : 4/8/21\par - Labs 1/21 noted PCR 0.6.. did not repeat urine as requested.. \par - on 7 mg pred w/ rare need for higher dose- fs MMF continues.. energy level stable at times overwhelming\par - continues to work from home.. and overall better tolerated than commuting into city... \par - rash on face continues to be active.. w/ no recent sun exposure.. and note redness in hands but not directly over knuckles - in between fingers.. \par - no overt joint swelling, no weakness, and continues to deny cardiopulmonary sx. \par - newly noted tremor with some difficulty writing.. \par - has not returned to derm in past few ys.. frustrated with persistent/ diffuse rash despite fs MMF.. \par \par 1) SLE/ DM sine myositis: high titer MELLY, dsDNA+ (intermittently) , + RNP (repeat neg), SSA+ > 8  \par \par 2) Depression/ 27 ys Clean and Sober: \par \par 3) Health mnt:\par Smoking- active > 1 PPD x 30+ ys \par HLD:  persistently elevated \par tchol 274\par Trigy  209\par HDL  60\par [id number]\par last year and again 4/17 was supposed to start statin, but did not.

## 2021-04-09 LAB
25(OH)D3 SERPL-MCNC: 63 NG/ML
ALBUMIN SERPL ELPH-MCNC: 4.6 G/DL
ALP BLD-CCNC: 66 U/L
ALT SERPL-CCNC: 16 U/L
ANION GAP SERPL CALC-SCNC: 13 MMOL/L
AST SERPL-CCNC: 24 U/L
BILIRUB SERPL-MCNC: 0.2 MG/DL
BUN SERPL-MCNC: 14 MG/DL
CALCIUM SERPL-MCNC: 10 MG/DL
CALCIUM SERPL-MCNC: 10 MG/DL
CHLORIDE SERPL-SCNC: 100 MMOL/L
CK SERPL-CCNC: 72 U/L
CO2 SERPL-SCNC: 25 MMOL/L
CREAT SERPL-MCNC: 0.66 MG/DL
CRP SERPL-MCNC: 4 MG/L
GLUCOSE SERPL-MCNC: 99 MG/DL
PARATHYROID HORMONE INTACT: 19 PG/ML
POTASSIUM SERPL-SCNC: 4.5 MMOL/L
PROT SERPL-MCNC: 7.1 G/DL
SODIUM SERPL-SCNC: 138 MMOL/L
TSH SERPL-ACNC: 1.92 UIU/ML

## 2021-04-15 ENCOUNTER — APPOINTMENT (OUTPATIENT)
Dept: RHEUMATOLOGY | Facility: CLINIC | Age: 56
End: 2021-04-15

## 2021-04-15 ENCOUNTER — TRANSCRIPTION ENCOUNTER (OUTPATIENT)
Age: 56
End: 2021-04-15

## 2021-04-15 LAB
APPEARANCE: CLEAR
BASOPHILS # BLD AUTO: 0.05 K/UL
BASOPHILS NFR BLD AUTO: 0.8 %
BILIRUBIN URINE: NEGATIVE
BLOOD URINE: NEGATIVE
C3 SERPL-MCNC: 126 MG/DL
C4 SERPL-MCNC: 26 MG/DL
COLOR: NORMAL
CREAT SPEC-SCNC: 23 MG/DL
CREAT/PROT UR: 0.3 RATIO
DSDNA AB SER-ACNC: 18 IU/ML
EOSINOPHIL # BLD AUTO: 0.1 K/UL
EOSINOPHIL NFR BLD AUTO: 1.6 %
ERYTHROCYTE [SEDIMENTATION RATE] IN BLOOD BY WESTERGREN METHOD: 29 MM/HR
GLUCOSE QUALITATIVE U: NEGATIVE
HCT VFR BLD CALC: 44.7 %
HGB BLD-MCNC: 14 G/DL
IMM GRANULOCYTES NFR BLD AUTO: 0.2 %
KETONES URINE: NEGATIVE
LEUKOCYTE ESTERASE URINE: NEGATIVE
LYMPHOCYTES # BLD AUTO: 0.75 K/UL
LYMPHOCYTES NFR BLD AUTO: 12.3 %
MAN DIFF?: NORMAL
MCHC RBC-ENTMCNC: 28.7 PG
MCHC RBC-ENTMCNC: 31.3 GM/DL
MCV RBC AUTO: 91.8 FL
MONOCYTES # BLD AUTO: 0.3 K/UL
MONOCYTES NFR BLD AUTO: 4.9 %
NEUTROPHILS # BLD AUTO: 4.89 K/UL
NEUTROPHILS NFR BLD AUTO: 80.2 %
NITRITE URINE: NEGATIVE
PH URINE: 6.5
PLATELET # BLD AUTO: 258 K/UL
PROT UR-MCNC: 7 MG/DL
PROTEIN URINE: NEGATIVE
RBC # BLD: 4.87 M/UL
RBC # FLD: 13.5 %
SPECIFIC GRAVITY URINE: 1.01
UROBILINOGEN URINE: NORMAL
WBC # FLD AUTO: 6.1 K/UL

## 2021-07-07 ENCOUNTER — APPOINTMENT (OUTPATIENT)
Dept: RHEUMATOLOGY | Facility: CLINIC | Age: 56
End: 2021-07-07
Payer: COMMERCIAL

## 2021-07-07 VITALS
HEART RATE: 75 BPM | TEMPERATURE: 97.5 F | DIASTOLIC BLOOD PRESSURE: 84 MMHG | OXYGEN SATURATION: 96 % | SYSTOLIC BLOOD PRESSURE: 140 MMHG | BODY MASS INDEX: 26.22 KG/M2 | HEIGHT: 63 IN | WEIGHT: 148 LBS

## 2021-07-07 PROCEDURE — 99215 OFFICE O/P EST HI 40 MIN: CPT

## 2021-07-07 PROCEDURE — 99072 ADDL SUPL MATRL&STAF TM PHE: CPT

## 2021-07-07 RX ORDER — PREDNISONE 20 MG/1
20 TABLET ORAL
Qty: 6 | Refills: 3 | Status: DISCONTINUED | COMMUNITY
Start: 2020-01-31 | End: 2021-07-07

## 2021-07-07 RX ORDER — FELODIPINE 2.5 MG/1
2.5 TABLET, EXTENDED RELEASE ORAL DAILY
Qty: 1 | Refills: 2 | Status: DISCONTINUED | COMMUNITY
Start: 2021-01-12 | End: 2021-07-07

## 2021-07-07 NOTE — PHYSICAL EXAM
[General Appearance - Alert] : alert [General Appearance - In No Acute Distress] : in no acute distress [General Appearance - Well Nourished] : well nourished [General Appearance - Well Developed] : well developed [General Appearance - Well-Appearing] : healthy appearing [PERRL With Normal Accommodation] : pupils were equal in size, round, and reactive to light [Extraocular Movements] : extraocular movements were intact [Auscultation Breath Sounds / Voice Sounds] : lungs were clear to auscultation bilaterally [Heart Rate And Rhythm] : heart rate was normal and rhythm regular [Heart Sounds] : normal S1 and S2 [Heart Sounds Gallop] : no gallops [Murmurs] : no murmurs [Heart Sounds Pericardial Friction Rub] : no pericardial rub [Arterial Pulses Carotid] : carotid pulses were normal with no bruits [Full Pulse] : the pedal pulses are present [Edema] : there was no peripheral edema [Cervical Lymph Nodes Enlarged Posterior Bilaterally] : posterior cervical [Cervical Lymph Nodes Enlarged Anterior Bilaterally] : anterior cervical [Supraclavicular Lymph Nodes Enlarged Bilaterally] : supraclavicular [Abnormal Walk] : normal gait [Nail Clubbing] : no clubbing  or cyanosis of the fingernails [Motor Tone] : muscle strength and tone were normal [Sensation] : the sensory exam was normal to light touch and pinprick [Motor Exam] : the motor exam was normal [Oriented To Time, Place, And Person] : oriented to person, place, and time [Impaired Insight] : insight and judgment were intact [Affect] : the affect was normal [Outer Ear] : the ears and nose were normal in appearance [Oropharynx] : the oropharynx was normal [] : the neck was supple [FreeTextEntry1] :  calm, appropriate

## 2021-07-07 NOTE — HISTORY OF PRESENT ILLNESS
[FreeTextEntry1] : 7/7/21 \par - trial felodipine no difference after 3 wks and didn't continue \par - increased discomfort diffuse arthralgias (severe- no joint swelling) and fatigue.. noted in past few mnths.. unable to tolerate less than 10 mg prednisone, skin worse with sun exposure face with some to lesser extent on hands recently.. despite fs MMF\par - no recent severe muscle spasms.. when present in past + response tizanidine and high dose pred\par - still + proteinuria but improved from 1/21 0.6-> 0.3... w/ nl C3/4 and CMP, CK.. updated labs needed now\par - continues to work FT from home, much better tolerated than long commute in/out city\par - depression stable, well controlled taking 75 mg venlafaxine.. \par - still smoking.. with productive cough.. congested but not discolored.. denies cp\par  \par \par 1) SLE/ DM sine myositis: high titer MELLY, dsDNA+ (intermittently) , + RNP (repeat neg), SSA+ > 8  \par \par 2) Depression/ 27 ys Clean and Sober: \par \par 3) Health mnt:\par Smoking- active > 1 PPD x 30+ ys \par HLD:  persistently elevated \par tchol 274\par Trigy  209\par HDL  60\par [id number]\par last year and again 4/17 was supposed to start statin, but did not.

## 2021-07-07 NOTE — REVIEW OF SYSTEMS
[Red Eyes] : red eyes [Dry Eyes] : dryness of the eyes [Hoarseness] : hoarseness [Wheezing] : wheezing [Cough] : cough [Arthralgias] : arthralgias [Joint Pain] : joint pain [Skin Lesions] : skin lesion [Sleep Disturbances] : sleep disturbances [Depression] : depression [As Noted in HPI] : as noted in HPI [Negative] : Heme/Lymph [Feeling Poorly] : not feeling poorly [Feeling Tired] : not feeling tired [Eye Pain] : no eye pain [SOB on Exertion] : no shortness of breath during exertion [Heartburn] : no heartburn [FreeTextEntry2] : fatigue- chronic  [FreeTextEntry3] : stable- no visual disturbance [FreeTextEntry6] : mild intermittent [FreeTextEntry7] : nausea w/ MMF better now but still present [FreeTextEntry9] :  hand pain and L arm, L lateral hip greatly improved now [de-identified] : see HPI- facial rash, subacute cutaneous lesions on underside of R arm  [de-identified] :  trouble sleeping without stable dose trazodone- better now on higher dose ... tapered down venlaxifine by 50% and doing well

## 2021-07-07 NOTE — ASSESSMENT
[FreeTextEntry1] : 57 yo wf with longstanding SLE/ DM sine myositis, HLD, Depression, recovering ETOH and recently dx w/ intraductal papilloma R breast s/p lumpectomy- no additional tx needed \par \par \par 1) SLE/ DM sine myositis: high titer MELLY, + SSA > 8,  dsDNA+ (intermittently) , + RNP in past, nothing + in past few ys, Bx + for SLE- ? discoid and/ or subacute cutaneous and considerable increase in Raynauds (still smoking) better in warmer weather.. didn't feel felodipine was effective - no lesions \par Disease activity- significant photosensitive rash on face dx + DM (2014- no bx- Dr. Martin), worse with sun exposure this summer and associated w/ increased arthralgias (no recent  synovitis) and overwhelming fatigue.  Increased pred to 10 mg.. and continues stable MMF 3 gm divided dose. Did not go to derm as requested.  \par Also concerned about cough and LLL crackles.. needs updated eval- refer to Dr. Gonzalez (was supposed to call last visit)... additionally, recently noted proteinuria.. if still present will need to see nephrology...last measure 4/21 down to 0.3... repeat today. \par For now continue current tx .. updated pulm/ derm eval priority  \par No myositis now or in past, labs ok\par Updated PFTs, CT chest and Echo 2019 all stable, needs updated studies refer to Dr. Gonzalez... still not done.  \par NOTE: \par - elixer MMF not better tolerated, nausea at times considerable \par - Failed to control w/ HCQ alone on low dose steroids x many ys, added Benlysta, tapered off steroids but after 1 yr, refused to continue- was not necessarily better. \par - 2014 seen by Say Phillips  for derm eval dx facial rash as DM but no bx.  \par - skin bx ys ago Dr. Perea + SLE no details available many ys ago \par \par 2) Depression/ 30+ ys Clean and Sober:  intermittently struggles w/ feelings of depression but no suicidal ideations-better past yr and decreased venlaxifine to 75 mg once daily, continues to have strong family support\par Fatigue chronically, worse when SLE/ DM not fully controlled but may also have FLOR (recommend pulm eval but not done)\par \par 3)CV risk: high given longstanding heavy smoker, chronic inflammatory state (SLE/DM) and long term use Steroids w/ \par - PreDM w/ HbA1c 5.9.. \par -  HLD:  persistently elevated, , TG  209, HDL  60, [id number]- updated orders now repeat.. \par last year and again 4/17 was supposed to start statin, but did not. ...hasn't seen PCP in past few ys despite repeated suggestions- STILL (repeatedly referred)\par Noted plaque formation on carotid studies, echo suggested ischemic change but stress test neg.  Cath was not done and continues to deny palpitations, cp, sob, rosas... despite extensive review subtle sx in females.. still denies.\par - Smoking: Each visit discussed long standing smoking and has no interest in stopping still   \par \par 4) Non compliance:  repeatedly as noted over years... f/u infrequent ov, failed to secure PCP- still - studies not done as requested. \par  Remain concerned about multiple risk factors for CVD, emphasized this today and every visit.. in past also discussed w/  pt and partner... but over time little changes.  \par Admits depression is a factor.. but doesn't feel this is completely the reason and denies depression at this time.  \par \par 5) Osteopenia: last study 10/15/20 w/ most severe T score -1.0 w/ 14% drop from previous study.. still low frax and not tx indicated. \par No hx fractures, previous tx \par Risk factors: long term steroid use, smoking\par \par 6) Cervical radiculopathy/ myofascial pain: doing well lately, much better with less stress \par  xrays 2/20 w/ cervical straightening  of cervical spine and if not better w/ steroids/ muscle relaxants will get MRI- all sx better with better\par \par 7) Regional pain:  nothing today.. \par  RGTB: in past  given IACS 80 mg 1/21 w/ excellent results, no return.  \par 2019..  L GTB ++:  s/p IA injection and toradol  w/ ++ relief .. in past doing well now\par  \par Vaccination: \par - annual flu vaccine annually \par - Pneumonia 23 4 ys ago\par - Prevnar 13 needed\par - Sars-Cov-2 Vaccine completed.. will check antibody levels\par \par \par \par Plan:\par - labs done today\par \par - continue fs  Cellcept (MMF)  but need to consider additional therapy, struggling to lower pred and rash continues.. need to review labs and then d/w Derm (Refer to Dr. Araiza's gp for 2nd opinion- evaluation of facial/ cape distribution and lesion on R upper arm)\par \par - Use  PRN tizanidine 4-8 mg daily at HS for severe neck or lower back spasms. \par \par - again recommend need a primary care:  Call Catalina Clemens  [phone].  If you have trouble getting appt let me know still recommended .. still \par \par - Prevnar 13 next visit!!! still needed go to local pharmacy orders are on file.. after CTscan \par \par - recommend continued work from home, disease activity  better controlled then ever!  On lowest dose steroids noted and tolerated in past 10 ys \par \par - rto 3-4 m \par \par - as always, you should consider decreasing cigarette smoking but not willing  to consider at this time- especially now with increase in raynauds sx  \par \par - continue at 10 mg prednisone till seen by Derm and Pulm \par \par - ok to continue with 200 mg trazodone\par \par - Make appt with pulm for:  Sleep study  and PFTs need updated and CTscan ... have repeated this request. Make pulm appt, Dr. Gonzalez \par [phone]\par \par . \par \par

## 2021-07-08 LAB
ALBUMIN SERPL ELPH-MCNC: 4.8 G/DL
ALP BLD-CCNC: 66 U/L
ALT SERPL-CCNC: 12 U/L
ANION GAP SERPL CALC-SCNC: 11 MMOL/L
APPEARANCE: CLEAR
AST SERPL-CCNC: 18 U/L
BASOPHILS # BLD AUTO: 0.04 K/UL
BASOPHILS NFR BLD AUTO: 0.5 %
BILIRUB SERPL-MCNC: 0.3 MG/DL
BILIRUBIN URINE: NEGATIVE
BLOOD URINE: NEGATIVE
BUN SERPL-MCNC: 16 MG/DL
C3 SERPL-MCNC: 116 MG/DL
C4 SERPL-MCNC: 25 MG/DL
CALCIUM SERPL-MCNC: 9.9 MG/DL
CHLORIDE SERPL-SCNC: 101 MMOL/L
CK SERPL-CCNC: 216 U/L
CO2 SERPL-SCNC: 26 MMOL/L
COLOR: YELLOW
COVID-19 SPIKE DOMAIN ANTIBODY INTERPRETATION: POSITIVE
CREAT SERPL-MCNC: 0.76 MG/DL
CREAT SPEC-SCNC: 143 MG/DL
CREAT/PROT UR: 0.1 RATIO
CRP SERPL-MCNC: 5 MG/L
DSDNA AB SER-ACNC: 13 IU/ML
EOSINOPHIL # BLD AUTO: 0.15 K/UL
EOSINOPHIL NFR BLD AUTO: 1.8 %
ERYTHROCYTE [SEDIMENTATION RATE] IN BLOOD BY WESTERGREN METHOD: 9 MM/HR
GLUCOSE QUALITATIVE U: NEGATIVE
GLUCOSE SERPL-MCNC: 79 MG/DL
HCT VFR BLD CALC: 48.1 %
HGB BLD-MCNC: 14.4 G/DL
IMM GRANULOCYTES NFR BLD AUTO: 0.4 %
KETONES URINE: NEGATIVE
LEUKOCYTE ESTERASE URINE: NEGATIVE
LYMPHOCYTES # BLD AUTO: 1.93 K/UL
LYMPHOCYTES NFR BLD AUTO: 23 %
MAN DIFF?: NORMAL
MCHC RBC-ENTMCNC: 28.6 PG
MCHC RBC-ENTMCNC: 29.9 GM/DL
MCV RBC AUTO: 95.6 FL
MONOCYTES # BLD AUTO: 0.57 K/UL
MONOCYTES NFR BLD AUTO: 6.8 %
NEUTROPHILS # BLD AUTO: 5.66 K/UL
NEUTROPHILS NFR BLD AUTO: 67.5 %
NITRITE URINE: NEGATIVE
PH URINE: 6
PLATELET # BLD AUTO: 225 K/UL
POTASSIUM SERPL-SCNC: 4.1 MMOL/L
PROT SERPL-MCNC: 7 G/DL
PROT UR-MCNC: 16 MG/DL
PROTEIN URINE: NEGATIVE
RBC # BLD: 5.03 M/UL
RBC # FLD: 14.4 %
SARS-COV-2 AB SERPL IA-ACNC: 53.5 U/ML
SODIUM SERPL-SCNC: 138 MMOL/L
SPECIFIC GRAVITY URINE: 1.03
UROBILINOGEN URINE: NORMAL
WBC # FLD AUTO: 8.38 K/UL

## 2021-07-20 ENCOUNTER — NON-APPOINTMENT (OUTPATIENT)
Age: 56
End: 2021-07-20

## 2021-07-21 ENCOUNTER — TRANSCRIPTION ENCOUNTER (OUTPATIENT)
Age: 56
End: 2021-07-21

## 2021-07-29 ENCOUNTER — TRANSCRIPTION ENCOUNTER (OUTPATIENT)
Age: 56
End: 2021-07-29

## 2021-08-18 ENCOUNTER — NON-APPOINTMENT (OUTPATIENT)
Age: 56
End: 2021-08-18

## 2021-08-18 ENCOUNTER — APPOINTMENT (OUTPATIENT)
Dept: DERMATOLOGY | Facility: CLINIC | Age: 56
End: 2021-08-18
Payer: COMMERCIAL

## 2021-08-18 DIAGNOSIS — L81.4 OTHER MELANIN HYPERPIGMENTATION: ICD-10-CM

## 2021-08-18 DIAGNOSIS — B37.2 CANDIDIASIS OF SKIN AND NAIL: ICD-10-CM

## 2021-08-18 DIAGNOSIS — L82.1 OTHER SEBORRHEIC KERATOSIS: ICD-10-CM

## 2021-08-18 DIAGNOSIS — L98.8 OTHER SPECIFIED DISORDERS OF THE SKIN AND SUBCUTANEOUS TISSUE: ICD-10-CM

## 2021-08-18 DIAGNOSIS — L72.3 SEBACEOUS CYST: ICD-10-CM

## 2021-08-18 PROCEDURE — 99214 OFFICE O/P EST MOD 30 MIN: CPT

## 2021-08-23 NOTE — PHYSICAL EXAM
[Alert] : alert [Oriented x 3] : ~L oriented x 3 [Well Nourished] : well nourished [Full Body Skin Exam Performed] : performed [FreeTextEntry3] : A full skin exam was performed including the scalp, face (including the lips, ears, nose and eyes), neck, chest, breasts, abdomen, back, buttocks, upper extremities and lower extremities.  The patient declined examination of the genitalia.  \par The exam revealed the following benign growths:\par Seborrheic keratoses.\par Lentigines.\par cystic nodule, trunk.  c/w benign EIC.\par \par erythematous papules, some excoriated, bilateral LE's.\par \par Erythema, macerated scale, and satellite papules, of the inferior breast folds.

## 2021-08-23 NOTE — HISTORY OF PRESENT ILLNESS
[FreeTextEntry1] : Patient presents for skin examination. [de-identified] : Rash under the breasts, recurrent.  No current self tx.

## 2021-08-23 NOTE — ASSESSMENT
[FreeTextEntry1] : A complete skin examination was performed.  There is no evidence of skin cancer.  We discussed the importance of photoprotection, including the use of hats, protective clothing and sunscreens with an SPF of at least 30.  Sun avoidance was also discussed.  The ABCDE's of melanoma was discussed.  Regular skin exams recommended.\par \par EIC - benign.\par Will remove if becomes bothersome.\par \par Intertrigo, candida\par Nizoral cream bid.\par Keep region dry as able.\par \par Arthropod reaction\par Lidex cream bid prn.\par \par Hx of SLE\par OK to use cutivate cream bid prn for flares.\par Emphasized importance of photoprotection / sunscreens / hats.

## 2021-10-12 ENCOUNTER — APPOINTMENT (OUTPATIENT)
Dept: DERMATOLOGY | Facility: CLINIC | Age: 56
End: 2021-10-12

## 2021-10-14 ENCOUNTER — APPOINTMENT (OUTPATIENT)
Dept: RHEUMATOLOGY | Facility: CLINIC | Age: 56
End: 2021-10-14

## 2021-12-22 ENCOUNTER — APPOINTMENT (OUTPATIENT)
Dept: INTERNAL MEDICINE | Facility: CLINIC | Age: 56
End: 2021-12-22
Payer: COMMERCIAL

## 2021-12-22 VITALS
OXYGEN SATURATION: 97 % | HEART RATE: 98 BPM | WEIGHT: 154 LBS | HEIGHT: 63 IN | RESPIRATION RATE: 16 BRPM | SYSTOLIC BLOOD PRESSURE: 138 MMHG | DIASTOLIC BLOOD PRESSURE: 80 MMHG | BODY MASS INDEX: 27.29 KG/M2 | TEMPERATURE: 98.6 F

## 2021-12-22 PROCEDURE — 99406 BEHAV CHNG SMOKING 3-10 MIN: CPT

## 2021-12-22 PROCEDURE — 99214 OFFICE O/P EST MOD 30 MIN: CPT | Mod: 25

## 2021-12-22 RX ORDER — NYSTATIN 100000 U/G
100000 OINTMENT TOPICAL 4 TIMES DAILY
Qty: 1 | Refills: 2 | Status: DISCONTINUED | COMMUNITY
Start: 2021-01-12 | End: 2021-12-22

## 2021-12-22 RX ORDER — PNEUMOCOCCAL 13-VALENT CONJUGATE VACCINE 2.2; 2.2; 2.2; 2.2; 2.2; 4.4; 2.2; 2.2; 2.2; 2.2; 2.2; 2.2; 2.2 UG/.5ML; UG/.5ML; UG/.5ML; UG/.5ML; UG/.5ML; UG/.5ML; UG/.5ML; UG/.5ML; UG/.5ML; UG/.5ML; UG/.5ML; UG/.5ML; UG/.5ML
INJECTION, SUSPENSION INTRAMUSCULAR
Qty: 1 | Refills: 0 | Status: DISCONTINUED | COMMUNITY
Start: 2021-07-07 | End: 2021-12-22

## 2021-12-22 RX ORDER — KETOCONAZOLE 20 MG/G
2 CREAM TOPICAL TWICE DAILY
Qty: 1 | Refills: 2 | Status: DISCONTINUED | COMMUNITY
Start: 2021-08-18 | End: 2021-12-22

## 2021-12-22 RX ORDER — IODOQUINOL, HYDROCORTISONE ACETATE AND ALOE VERA LEAF 10; 20; 10 MG/G; MG/G; MG/G
1-2-1 GEL TOPICAL
Refills: 0 | Status: DISCONTINUED | COMMUNITY
End: 2021-12-22

## 2021-12-22 RX ORDER — FLUOCINONIDE 0.5 MG/G
0.05 GEL TOPICAL TWICE DAILY
Qty: 1 | Refills: 1 | Status: DISCONTINUED | COMMUNITY
Start: 2021-08-18 | End: 2021-12-22

## 2021-12-22 RX ORDER — TIZANIDINE 4 MG/1
4 TABLET ORAL
Qty: 60 | Refills: 2 | Status: DISCONTINUED | COMMUNITY
Start: 2020-01-31 | End: 2021-12-22

## 2021-12-22 RX ORDER — HYDROCORTISONE ACETATE AND PRAMOXINE HYDROCHLORIDE 20; 10 MG/G; MG/G
GEL TOPICAL
Refills: 0 | Status: DISCONTINUED | COMMUNITY
End: 2021-12-22

## 2021-12-22 RX ORDER — FLUTICASONE PROPIONATE 0.5 MG/G
0.05 CREAM TOPICAL TWICE DAILY
Qty: 1 | Refills: 1 | Status: DISCONTINUED | COMMUNITY
Start: 2021-08-18 | End: 2021-12-22

## 2021-12-22 NOTE — COUNSELING
[Yes] : Risk of tobacco use and health benefits of smoking cessation discussed: Yes [Cessation strategies including cessation program discussed] : Cessation strategies including cessation program discussed [Use of nicotine replacement therapies and other medications discussed] : Use of nicotine replacement therapies and other medications discussed [No] : Not willing to quit smoking [FreeTextEntry1] : 8

## 2021-12-22 NOTE — PHYSICAL EXAM
[No Acute Distress] : no acute distress [Normal Oropharynx] : normal oropharynx [Normal Appearance] : normal appearance [No Neck Mass] : no neck mass [Normal Rate/Rhythm] : normal rate/rhythm [Normal S1, S2] : normal s1, s2 [No Murmurs] : no murmurs [No Resp Distress] : no resp distress [No Abnormalities] : no abnormalities [Benign] : benign [Normal Gait] : normal gait [No Clubbing] : no clubbing [No Cyanosis] : no cyanosis [No Edema] : no edema [FROM] : FROM [Normal Color/ Pigmentation] : normal color/ pigmentation [No Focal Deficits] : no focal deficits [Oriented x3] : oriented x3 [Normal Affect] : normal affect [TextBox_68] : Scattered expiratory rhonchi

## 2021-12-22 NOTE — HISTORY OF PRESENT ILLNESS
[TextBox_4] : Ms. Owen presents for followup evaluation. She is feeling well. Patient states she is not using the Symbicort inhaler on a daily basis. She is using it p.r.n. Ms. Owen states that she has not used her albuterol inhaler for rescue therapy in several weeks. She recently had a CT scan of the chest on 7/15/21 which showed emphysematous changes at the lung apices. There was also scattered foci of groundglass attenuation with mild bronchial wall thickening. We will micronodular presents abutting the fissure which were probably intrapulmonary lymph nodes. Ms. Owen continues to smoke one pack of cigarettes per day. She has no hemoptysis, anorexia or weight loss. ms. Owen does have a history of systemic lupus erythematosus.

## 2021-12-22 NOTE — DISCUSSION/SUMMARY
[FreeTextEntry1] : Ms. Spann presents for a followup evaluation.\par \par #1. She will continue on Symbicort 80/12.5 mcg 2 puffs b.i.d. I stressed the importance of using the Symbicort on a daily basis. She has albuterol p.r.n. for rescue therapy.\par \par #2. Patient continues to smoke one pack of cigarettes per day. She is again been counseled in smoking cessation.\par \par #3. Patient had a CT scan of the chest in July which showed upper lobe emphysema and scattered ground glass densities. CT scan will be reviewed and further workup will be discussed with patient.\par \par #4. Followup in 6 months with complete pulmonary function tests.

## 2022-02-09 ENCOUNTER — APPOINTMENT (OUTPATIENT)
Dept: RHEUMATOLOGY | Facility: CLINIC | Age: 57
End: 2022-02-09
Payer: COMMERCIAL

## 2022-02-09 PROCEDURE — 99442: CPT

## 2022-02-09 RX ORDER — MYCOPHENOLATE MOFETIL 250 MG/1
CAPSULE ORAL
Refills: 0 | Status: DISCONTINUED | COMMUNITY
End: 2022-02-09

## 2022-03-21 ENCOUNTER — EMERGENCY (EMERGENCY)
Facility: HOSPITAL | Age: 57
LOS: 1 days | Discharge: HOME | End: 2022-03-21
Attending: STUDENT IN AN ORGANIZED HEALTH CARE EDUCATION/TRAINING PROGRAM
Payer: COMMERCIAL

## 2022-03-21 VITALS
OXYGEN SATURATION: 99 % | DIASTOLIC BLOOD PRESSURE: 84 MMHG | HEIGHT: 63 IN | TEMPERATURE: 99 F | RESPIRATION RATE: 20 BRPM | HEART RATE: 102 BPM | WEIGHT: 151.9 LBS | SYSTOLIC BLOOD PRESSURE: 134 MMHG

## 2022-03-21 DIAGNOSIS — J34.2 DEVIATED NASAL SEPTUM: Chronic | ICD-10-CM

## 2022-03-21 LAB
ALBUMIN SERPL ELPH-MCNC: 4 G/DL — SIGNIFICANT CHANGE UP (ref 3.3–5.2)
ALP SERPL-CCNC: 64 U/L — SIGNIFICANT CHANGE UP (ref 40–120)
ALT FLD-CCNC: 12 U/L — SIGNIFICANT CHANGE UP
ANION GAP SERPL CALC-SCNC: 12 MMOL/L — SIGNIFICANT CHANGE UP (ref 5–17)
AST SERPL-CCNC: 19 U/L — SIGNIFICANT CHANGE UP
BASOPHILS # BLD AUTO: 0.04 K/UL — SIGNIFICANT CHANGE UP (ref 0–0.2)
BASOPHILS NFR BLD AUTO: 0.5 % — SIGNIFICANT CHANGE UP (ref 0–2)
BILIRUB SERPL-MCNC: 0.3 MG/DL — LOW (ref 0.4–2)
BUN SERPL-MCNC: 13.8 MG/DL — SIGNIFICANT CHANGE UP (ref 8–20)
CALCIUM SERPL-MCNC: 9 MG/DL — SIGNIFICANT CHANGE UP (ref 8.6–10.2)
CHLORIDE SERPL-SCNC: 100 MMOL/L — SIGNIFICANT CHANGE UP (ref 98–107)
CO2 SERPL-SCNC: 24 MMOL/L — SIGNIFICANT CHANGE UP (ref 22–29)
CREAT SERPL-MCNC: 0.58 MG/DL — SIGNIFICANT CHANGE UP (ref 0.5–1.3)
EGFR: 105 ML/MIN/1.73M2 — SIGNIFICANT CHANGE UP
EOSINOPHIL # BLD AUTO: 0.07 K/UL — SIGNIFICANT CHANGE UP (ref 0–0.5)
EOSINOPHIL NFR BLD AUTO: 0.8 % — SIGNIFICANT CHANGE UP (ref 0–6)
GLUCOSE SERPL-MCNC: 85 MG/DL — SIGNIFICANT CHANGE UP (ref 70–99)
HCT VFR BLD CALC: 41.8 % — SIGNIFICANT CHANGE UP (ref 34.5–45)
HGB BLD-MCNC: 13.6 G/DL — SIGNIFICANT CHANGE UP (ref 11.5–15.5)
IMM GRANULOCYTES NFR BLD AUTO: 0.2 % — SIGNIFICANT CHANGE UP (ref 0–1.5)
LYMPHOCYTES # BLD AUTO: 1.09 K/UL — SIGNIFICANT CHANGE UP (ref 1–3.3)
LYMPHOCYTES # BLD AUTO: 13.1 % — SIGNIFICANT CHANGE UP (ref 13–44)
MCHC RBC-ENTMCNC: 28.6 PG — SIGNIFICANT CHANGE UP (ref 27–34)
MCHC RBC-ENTMCNC: 32.5 GM/DL — SIGNIFICANT CHANGE UP (ref 32–36)
MCV RBC AUTO: 88 FL — SIGNIFICANT CHANGE UP (ref 80–100)
MONOCYTES # BLD AUTO: 0.41 K/UL — SIGNIFICANT CHANGE UP (ref 0–0.9)
MONOCYTES NFR BLD AUTO: 4.9 % — SIGNIFICANT CHANGE UP (ref 2–14)
NEUTROPHILS # BLD AUTO: 6.72 K/UL — SIGNIFICANT CHANGE UP (ref 1.8–7.4)
NEUTROPHILS NFR BLD AUTO: 80.5 % — HIGH (ref 43–77)
PLATELET # BLD AUTO: 219 K/UL — SIGNIFICANT CHANGE UP (ref 150–400)
POTASSIUM SERPL-MCNC: 4.6 MMOL/L — SIGNIFICANT CHANGE UP (ref 3.5–5.3)
POTASSIUM SERPL-SCNC: 4.6 MMOL/L — SIGNIFICANT CHANGE UP (ref 3.5–5.3)
PROT SERPL-MCNC: 6.8 G/DL — SIGNIFICANT CHANGE UP (ref 6.6–8.7)
RBC # BLD: 4.75 M/UL — SIGNIFICANT CHANGE UP (ref 3.8–5.2)
RBC # FLD: 13 % — SIGNIFICANT CHANGE UP (ref 10.3–14.5)
SODIUM SERPL-SCNC: 136 MMOL/L — SIGNIFICANT CHANGE UP (ref 135–145)
WBC # BLD: 8.35 K/UL — SIGNIFICANT CHANGE UP (ref 3.8–10.5)
WBC # FLD AUTO: 8.35 K/UL — SIGNIFICANT CHANGE UP (ref 3.8–10.5)

## 2022-03-21 PROCEDURE — 80053 COMPREHEN METABOLIC PANEL: CPT

## 2022-03-21 PROCEDURE — 99284 EMERGENCY DEPT VISIT MOD MDM: CPT | Mod: 25

## 2022-03-21 PROCEDURE — 96374 THER/PROPH/DIAG INJ IV PUSH: CPT

## 2022-03-21 PROCEDURE — 99285 EMERGENCY DEPT VISIT HI MDM: CPT

## 2022-03-21 PROCEDURE — 85025 COMPLETE CBC W/AUTO DIFF WBC: CPT

## 2022-03-21 PROCEDURE — 72131 CT LUMBAR SPINE W/O DYE: CPT | Mod: MA

## 2022-03-21 PROCEDURE — 36415 COLL VENOUS BLD VENIPUNCTURE: CPT

## 2022-03-21 PROCEDURE — 72131 CT LUMBAR SPINE W/O DYE: CPT | Mod: 26,MA

## 2022-03-21 RX ORDER — LIDOCAINE 4 G/100G
1 CREAM TOPICAL
Qty: 4 | Refills: 0
Start: 2022-03-21 | End: 2022-03-24

## 2022-03-21 RX ORDER — GABAPENTIN 400 MG/1
1 CAPSULE ORAL
Qty: 21 | Refills: 0
Start: 2022-03-21 | End: 2022-03-27

## 2022-03-21 RX ORDER — IBUPROFEN 200 MG
1 TABLET ORAL
Qty: 20 | Refills: 0
Start: 2022-03-21 | End: 2022-03-25

## 2022-03-21 RX ORDER — KETOROLAC TROMETHAMINE 30 MG/ML
15 SYRINGE (ML) INJECTION ONCE
Refills: 0 | Status: DISCONTINUED | OUTPATIENT
Start: 2022-03-21 | End: 2022-03-21

## 2022-03-21 RX ORDER — LIDOCAINE 4 G/100G
1 CREAM TOPICAL ONCE
Refills: 0 | Status: COMPLETED | OUTPATIENT
Start: 2022-03-21 | End: 2022-03-21

## 2022-03-21 RX ADMIN — Medication 15 MILLIGRAM(S): at 12:19

## 2022-03-21 RX ADMIN — LIDOCAINE 1 PATCH: 4 CREAM TOPICAL at 12:19

## 2022-03-21 NOTE — ED STATDOCS - NSFOLLOWUPCLINICS_GEN_ALL_ED_FT
[facility] - [facility]  Ortho/Spine  [address] [place] [address]  Phone: [phone]  Fax:

## 2022-03-21 NOTE — ED STATDOCS - PHYSICAL EXAMINATION
Const: Awake, alert and oriented. In no acute distress. Well appearing.  HEENT: NC/AT. Moist mucous membranes.  Eyes: No scleral icterus. EOMI.  Neck:. Soft and supple. Full ROM without pain.  Cardiac: Regular rate and regular rhythm. +S1/S2. Peripheral pulses 2+ and symmetric. No LE edema.  Resp: Speaking in full sentences. No evidence of respiratory distress. No wheezes, rales or rhonchi.  Abd: Soft, non-tender, non-distended. Normal bowel sounds in all 4 quadrants. No guarding or rebound.  Back: right para spinal lumbar tenderness, no midline spine tenderness, no step off or deformities.   Skin: No rashes, abrasions or lacerations.  Lymph: No cervical lymphadenopathy.  Neuro: A&Ox3, moving all extremities symmetrically, follows commands, motor karuna upper and lower ext 5/5, sensory symm and intact CN 2-12 grossly intact, no ataxia, no nystagmus, no dysmetria, no ddk, symm karuna, no pronator drift, normal pedal reflexes, Const: Awake, alert and oriented. In no acute distress. Well appearing.  HEENT: NC/AT. Moist mucous membranes.  Eyes: No scleral icterus. EOMI.  Neck:. Soft and supple. Full ROM without pain.  Cardiac: Regular rate and regular rhythm. +S1/S2. Peripheral pulses 2+ and symmetric. No LE edema.  Resp: Speaking in full sentences. No evidence of respiratory distress. No wheezes, rales or rhonchi.  Abd: Soft, non-tender, non-distended. Normal bowel sounds in all 4 quadrants. No guarding or rebound.  Back: right para spinal lumbar tenderness, no midline spine tenderness, no step off or deformities.   Skin: No rashes, abrasions or lacerations.  Lymph: No cervical lymphadenopathy.  Neuro: A&Ox3, moving all extremities symmetrically, follows commands, motor karuna upper and lower ext 5/5, sensory symm and intact CN 2-12 grossly intact, no ataxia, no nystagmus, no dysmetria, no ddk, symm karuna, no pronator drift, normal patellar and ankle reflexes bilaterally

## 2022-03-21 NOTE — ED STATDOCS - NSICDXPASTMEDICALHX_GEN_ALL_CORE_FT
PAST MEDICAL HISTORY:  COPD, severity to be determined     Intraductal cancer of right breast     Lupus (systemic lupus erythematosus)     Raynaud disease

## 2022-03-21 NOTE — ED ADULT TRIAGE NOTE - CHIEF COMPLAINT QUOTE
Pt  injured her back yesterday after lifting barbecue - was eval in Urgent Care ad was diagnosed with L 1 compression Fx- was told to come to ED for further eval and treatment

## 2022-03-21 NOTE — ED STATDOCS - OBJECTIVE STATEMENT
58 y/o female with PMHx of Lupus on prednisone, c/o back pain. Pt reports lifting a grill yesterday when she heard a crack in her back. Pt went to urgent care yesterday, had a XR done which showed a compression fracture at L1. Pt was informed to come to ER yesterday. Pt took muscle relaxer and oxycodone with no relief. Denies numbness or urinary incontinence. Pt denies fevers/chills, ha, loc, focal neuro deficits, cp/sob/palp, cough, abd pain/n/v/d, urinary symptoms, recent travel and sick contacts. 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 Pt is a smoker.

## 2022-03-21 NOTE — ED STATDOCS - NSFOLLOWUPINSTRUCTIONS_ED_ALL_ED_FT
Lumbar Spine Fracture       A lumbar spine fracture is a break in one of the bones of the lower back. Lumbar spine fractures can vary from mild to severe. The most severe types are those that:  •Cause the broken bones to move out of place (unstable).      •Injure or press on the spinal cord.      During recovery, it is normal to have pain and stiffness in the lower back for weeks.      What are the causes?    This condition may be caused by:  •A fall.      •A car accident.      •A gunshot wound.      •A hard, direct hit to the back.        What increases the risk?    You are more likely to develop this condition if:  •You are in a situation that could result in a fall or other violent injury.      •You have a condition that causes weakness in the bones (osteoporosis).        What are the signs or symptoms?    The main symptom of this condition is severe pain in the lower back. If a fracture is complex or severe, there may also be:  •A misshapen or swollen area on the lower back.      •Limited ability to move an area of the lower back.      •Inability to empty the bladder (urinary retention).      •Loss of bowel or bladder control (incontinence).      •Loss of strength or sensation in the legs, feet, and toes.      •Inability to move (paralysis).        How is this diagnosed?    This condition is diagnosed based on:  •A physical exam.      •Symptoms and what happened just before they developed.      •The results of imaging tests, such as an X-ray, CT scan, or MRI.      If your nerves have been damaged, you may also have other tests to find out the extent of the damage.      How is this treated?    Treatment for this condition depends on how severe the injury is. Most fractures can be treated with:  •A back brace.      •Bed rest and activity restrictions.      •Pain medicine.      •Physical therapy.      Fractures that are complex, involve multiple bones, or make the spine unstable may require surgery. Surgery is done:  •To remove pressure from the nerves or spinal cord.      •To stabilize the broken pieces of bone.        Follow these instructions at home:    Medicines     •Take over-the-counter and prescription medicines only as told by your health care provider.      • Do not drive or use heavy machinery while taking prescription pain medicine.    •If you are taking prescription pain medicine, take actions to prevent or treat constipation. Your health care provider may recommend that you:   •Drink enough fluid to keep your urine pale yellow.       •Eat foods that are high in fiber, such as fresh fruits and vegetables, whole grains, and beans.       •Limit foods that are high in fat and processed sugars, such as fried or sweet foods.       •Take an over-the-counter or prescription medicine for constipation.        If you have a brace:     •Wear the back brace as told by your health care provider. Remove it only as told by your health care provider.      •Keep the brace clean.    •If the brace is not waterproof:  •Do not let it get wet.      •Cover it with a watertight covering when you take a bath or a shower.        Activity     •Stay in bed (on bed rest) only as directed by your health care provider.      •Do exercises to improve motion and strength in your back (physical therapy), if your health care provider tells you to do so.      •Return to your normal activities as directed by your health care provider. Ask your health care provider what activities are safe for you.        Managing pain, stiffness, and swelling    •If directed, put ice on the injured area:  •If you have a removable brace, remove it as told by your health care provider.      •Put ice in a plastic bag.      •Place a towel between your skin and the bag.      •Leave the ice on for 20 minutes, 2–3 times a day.        General instructions     • Do not use any products that contain nicotine or tobacco, such as cigarettes and e-cigarettes. These can delay healing after injury. If you need help quitting, ask your health care provider.      • Do not drink alcohol. Alcohol can interfere with your treatment.    •Keep all follow-up visits as directed by your health care provider. This is important.   •Failing to follow up as recommended could result in permanent injury, disability, or long-lasting (chronic) pain.          Contact a health care provider if:    •You have a fever.      •Your pain medicine is not helping.      •Your pain does not get better over time.      •You cannot return to your normal activities as planned or expected.        Get help right away if:    •You have difficulty breathing.      •Your pain is very bad and it suddenly gets worse.      •You have numbness, tingling, or weakness in any part of your body.      •You are unable to empty your bladder.      •You cannot control your bladder or bowels.      •You are unable to move any body part (paralysis) that is below the level of your injury.      •You vomit.      •You have pain in your abdomen.        Summary    •A lumbar spine fracture is a break in one of the bones of the lower back.      •The main symptom of this condition is severe pain in the lower back. If a fracture is complex, there may also be numbness, tingling, or paralysis in the legs.      •Treatment depends on how severe the injury is. Most fractures can be treated with a back brace, bed rest and activity restrictions, pain medicine, and physical therapy.      •Fractures that are complex, involve multiple bones, or make the spine unstable may require surgery.      This information is not intended to replace advice given to you by your health care provider. Make sure you discuss any questions you have with your health care provider.

## 2022-03-21 NOTE — ED STATDOCS - PROGRESS NOTE DETAILS
Marcus Cassidy: Pt seen by intake physician and HPI/ROS/PE/plan reviewed Confirmed and adjusted were appropriate.    PE: GEN: Awake, alert,  NAD,  EYES: PERRL CARDIAC: Reg rate and rhythm, S1,S2, RRR  RESP: No distress noted. Lungs CTA bilaterally no wheeze, ronchi, rales. ABD: soft,  non-tender, no guarding. . NEURO: A&O x 3, CN II-Xii GI, MAEx 4,  5/5/ motors, = sensation, no pronator drift or ataxia.   PLAN: PT with stable VS, no acute distress, non toxic appearing, tolerating PO in the ED, Pt neuro vasc intact, Pt informed of acute findings, Pt educated about the need for MRI, Pt offered obs stay for MRI, Pt differed further stay at this time. Pt offered help arranging follow up through pt advocacy office that she differed at this time, Pt to be dc home with pain management, follow up to spinal center, educated about when to return to the ED if needed. PT verbalizes that he understands all instructions and results. Pt informed that ED is open and available 24/7 365 days a yr, encouraged to return to the ED if they have any change in condition, or feel the need for revaluation.

## 2022-03-21 NOTE — ED STATDOCS - NS ED ATTENDING STATEMENT MOD
This was a shared visit with the LAURIE. I reviewed and verified the documentation and independently performed the documented:

## 2022-03-21 NOTE — ED STATDOCS - CLINICAL SUMMARY MEDICAL DECISION MAKING FREE TEXT BOX
56 y/o female with PMHx of Lupus on prednisone, c/o back pain.  Pt is neuro and reflexes intact. Eval for lumbar fracture. Will do CT scan,

## 2022-03-21 NOTE — ED STATDOCS - ADDITIONAL NOTES AND INSTRUCTIONS:
PT was evaluated At [facility] ED and was found to have a condition that warranted time of to rest and heal from WORK/SCHOOL.   Marcus Baxter PA-C

## 2022-03-21 NOTE — ED STATDOCS - CCCP TRG CHIEF CMPLNT
Problem: Pneumonia  Goal: S/S of acute pneumonia are resolved  Description: If acute pneumonia is present, monitor for resolution of fever, cough, secretions and other test values based on presentation.  Note: Patient reports coughing up sputum and blood for the last week.        back pain/injury

## 2022-03-21 NOTE — ED STATDOCS - PATIENT PORTAL LINK FT
You can access the FollowMyHealth Patient Portal offered by [facility] by registering at the following website: [email or web address]. By joining [employer]’s FollowMyHealth portal, you will also be able to view your health information using other applications (apps) compatible with our system.

## 2022-03-22 ENCOUNTER — LABORATORY RESULT (OUTPATIENT)
Age: 57
End: 2022-03-22

## 2022-03-22 ENCOUNTER — APPOINTMENT (OUTPATIENT)
Dept: RHEUMATOLOGY | Facility: CLINIC | Age: 57
End: 2022-03-22
Payer: COMMERCIAL

## 2022-03-22 VITALS
OXYGEN SATURATION: 97 % | SYSTOLIC BLOOD PRESSURE: 124 MMHG | HEART RATE: 87 BPM | WEIGHT: 153 LBS | BODY MASS INDEX: 27.1 KG/M2 | TEMPERATURE: 97.6 F | DIASTOLIC BLOOD PRESSURE: 72 MMHG

## 2022-03-22 DIAGNOSIS — M54.12 RADICULOPATHY, CERVICAL REGION: ICD-10-CM

## 2022-03-22 PROCEDURE — 96372 THER/PROPH/DIAG INJ SC/IM: CPT

## 2022-03-22 PROCEDURE — 99214 OFFICE O/P EST MOD 30 MIN: CPT | Mod: 25

## 2022-03-23 PROBLEM — M54.12 CERVICAL RADICULOPATHY, ACUTE: Status: ACTIVE | Noted: 2020-01-31

## 2022-03-23 RX ORDER — KETOROLAC TROMETHAMINE 15 MG/ML
15 INJECTION, SOLUTION INTRAMUSCULAR; INTRAVENOUS
Refills: 0 | Status: COMPLETED | OUTPATIENT
Start: 2022-03-23

## 2022-03-23 RX ADMIN — KETOROLAC TROMETHAMINE 0 MG/ML: 15 INJECTION, SOLUTION INTRAMUSCULAR; INTRAVENOUS at 00:00

## 2022-03-23 NOTE — PHYSICAL EXAM
[General Appearance - In No Acute Distress] : in no acute distress [General Appearance - Alert] : alert [General Appearance - Well Nourished] : well nourished [General Appearance - Well Developed] : well developed [General Appearance - Well-Appearing] : healthy appearing [PERRL With Normal Accommodation] : pupils were equal in size, round, and reactive to light [Extraocular Movements] : extraocular movements were intact [Outer Ear] : the ears and nose were normal in appearance [Oropharynx] : the oropharynx was normal [] : no respiratory distress [Auscultation Breath Sounds / Voice Sounds] : lungs were clear to auscultation bilaterally [Heart Rate And Rhythm] : heart rate was normal and rhythm regular [Heart Sounds] : normal S1 and S2 [Murmurs] : no murmurs [Heart Sounds Gallop] : no gallops [Heart Sounds Pericardial Friction Rub] : no pericardial rub [Arterial Pulses Carotid] : carotid pulses were normal with no bruits [Full Pulse] : the pedal pulses are present [Edema] : there was no peripheral edema [Cervical Lymph Nodes Enlarged Posterior Bilaterally] : posterior cervical [Cervical Lymph Nodes Enlarged Anterior Bilaterally] : anterior cervical [Supraclavicular Lymph Nodes Enlarged Bilaterally] : supraclavicular [Abnormal Walk] : normal gait [Nail Clubbing] : no clubbing  or cyanosis of the fingernails [Motor Tone] : muscle strength and tone were normal [Sensation] : the sensory exam was normal to light touch and pinprick [Motor Exam] : the motor exam was normal [Oriented To Time, Place, And Person] : oriented to person, place, and time [Impaired Insight] : insight and judgment were intact [Affect] : the affect was normal [Axillary Lymph Nodes Enlarged Bilaterally] : axillary [No Focal Deficits] : no focal deficits [FreeTextEntry1] :  calm, appropriate

## 2022-03-23 NOTE — HISTORY OF PRESENT ILLNESS
[FreeTextEntry1] : 3/22/22\par - chronic diffuse arthralgias/ myalgias, not nec worse on 5 mg pred.. for past few days... tapered from 7 mg.  Continues 3 gm MMF.   Seen by derm, no change in therapy indicated.  Advised to continue to avoid sun.. photoprotection.  Still denies over muscle weakness. \par - NEW:  vertebral compression fracture L1- after lifting, felt pop and since then severe pain, confirmed on imaging has appt w/ ortho this wk to address. Pain severe using opiates/ NSAIDs and muscle relaxants. Also given gabap/ lido but not tried.. \par - still + proteinuria but improved from 1/21 0.6-> 0.3... -> 0.1 7/21 w/ nl C3/4 and CMP, CK.. updated labs needed now \par - continues to work FT from home, much better tolerated than long commute in/out city\par - depression stable, well controlled taking 75 mg venlafaxine.. \par - still smoking.. with productive cough.. congested but not discolored.. denies cp. Noted CTscan GGO on 7/21 study, repeat 3/22 w/ nearly full resolution and obvious emphysema..  Has not had PFTs full set since 2019.. \par - anxious about possible thyroid disease 2/2 "heterogenous" thyroid on CTscan of chest \par  \par \par 1) SLE/ DM sine myositis: high titer MELLY, dsDNA+ (intermittently) , + RNP (repeat neg), SSA+ > 8  \par \par 2) Depression/ 27 ys Clean and Sober: \par \par 3) Health mnt:\par Smoking- active > 1 PPD x 30+ ys \par HLD:  persistently elevated \par tchol 274\par Trigy  209\par HDL  60\par [id number]\par last year and again 4/17 was supposed to start statin, but did not.

## 2022-03-23 NOTE — REVIEW OF SYSTEMS
[Red Eyes] : red eyes [Dry Eyes] : dryness of the eyes [Hoarseness] : hoarseness [Wheezing] : wheezing [Cough] : cough [Arthralgias] : arthralgias [Joint Pain] : joint pain [Skin Lesions] : skin lesion [Sleep Disturbances] : sleep disturbances [Depression] : depression [As Noted in HPI] : as noted in HPI [Negative] : Heme/Lymph [Feeling Poorly] : not feeling poorly [Feeling Tired] : not feeling tired [Eye Pain] : no eye pain [SOB on Exertion] : no shortness of breath during exertion [Heartburn] : no heartburn [FreeTextEntry2] : fatigue- chronic  [FreeTextEntry3] : stable- no visual disturbance [FreeTextEntry6] : mild intermittent [FreeTextEntry7] : nausea w/ MMF better now but still present [FreeTextEntry9] :  hand pain and L arm, L lateral hip greatly improved now [de-identified] : see HPI- facial rash, subacute cutaneous lesions on underside of R arm  [de-identified] :  trouble sleeping without stable dose trazodone- better now on higher dose ... tapered down venlaxifine by 50% and doing well

## 2022-03-23 NOTE — PROCEDURE
[Risks] : risks [Benefits] : benefits [Alternatives] : alternatives [Consent Obtained] : written consent was obtained prior to the procedure and is detailed in the patient's record [Patient] : Prior to the start of the procedure a time out was taken and the identity of the patient was confirmed via name and date of birth with the patient. The correct site and the procedure to be performed were confirmed. The correct side was confirmed if applicable. The availability of the correct equipment was verified [Therapeutic] : therapeutic [#1 Site: ______] : #1 site identified in the [unfilled] [Betadine] : betadine solution [Alcohol] : alcohol [25 gauge 1.5 inch] : A 25 gauge 1.5 inch needle was used [Tolerated Well] : the patient tolerated the procedure well [No Complications] : there were no complications [Instructions Given] : handouts/patient instructions were given to patient [Patient Instructed to Call] : patient was instructed to call if redness at site, a decrease in range of motion or an increase in pain is noted after procedure. [de-identified] : toradol 15 mg x 2  [FreeTextEntry1] : - you were given Toradol 30 mg IM today... you have tablets of 10 mg you can take for severe pain up to 40 mg daily x no more than 5 days..\par

## 2022-03-23 NOTE — ASSESSMENT
[FreeTextEntry1] : 58 yo wf with longstanding SLE/ DM sine myositis, HLD, Depression, recovering ETOH and recently dx w/ intraductal papilloma R breast s/p lumpectomy- no additional tx needed \par \par \par 1) SLE/ DM sine myositis: high titer MELLY, + SSA > 8,  dsDNA+ (intermittently) , + RNP in past, nothing + in past few ys, Bx + for SLE- ? discoid and/ or subacute cutaneous and considerable increase in Raynauds (still smoking) better in warmer weather.. didn't feel felodipine was effective - no lesions \par Disease activity- significant photosensitive rash on face dx + DM (2014- no bx- Dr. Martin), worse with sun exposure this summer and associated w/ increased arthralgias (no recent  synovitis) and overwhelming fatigue.  Increased pred to 10 mg recently decreased to 5 mg w/ increase in myalgias/ arthralgias...but planning on tapering off - advised to decrease by 1 mg every 2 wks till off and  continue stable MMF 3 gm divided dose. Now working w/ Dr. Teran. \par Also concerned about cough and LLL crackles.... slightly better now did not see Dr. Gonzalez (was supposed to call last visit)... additionally, recently noted proteinuria.. if still present will need to see nephrology...last measure 4/21 down to 0.3... repeat today. \par For now continue current tx .. updated pulm/ derm eval priority  \par No myositis now or in past, labs ok\par Updated PFTs still needed, CT chest and Echo 2019 -> 3/22 stable- improved,\par NOTE: \par - elixer MMF not better tolerated, nausea at times considerable \par - Failed to control w/ HCQ alone on low dose steroids x many ys, added Benlysta, tapered off steroids but after 1 yr, refused to continue- was not necessarily better. \par - 2014 seen by Say Phillips  for derm eval dx facial rash as DM but no bx.  \par - skin bx ys ago Dr. Perea + SLE no details available many ys ago \par \par 2) Depression/ 30+ ys Clean and Sober:  intermittently struggles w/ feelings of depression but no suicidal ideations-better past yr and decreased venlaxifine to 75 mg once daily, continues to have strong family support\par Fatigue chronically, worse when SLE/ DM not fully controlled but may also have FLOR (recommend pulm eval but not done)\par \par 3) CV risk: high given longstanding heavy smoker, chronic inflammatory state (SLE/DM) and long term use Steroids w/ \par - PreDM w/ HbA1c 5.9.. \par -  HLD:  persistently elevated, , TG  209, HDL  60, [id number]- updated orders now repeat.. \par last year and again 4/17 was supposed to start statin, but did not. ...hasn't seen PCP in past few ys despite repeated suggestions- STILL (repeatedly referred)\par Noted plaque formation on carotid studies, echo suggested ischemic change but stress test neg.  Cath was not done and continues to deny palpitations, cp, sob, rosas... despite extensive review subtle sx in females.. still denies.\par - Smoking: Each visit discussed long standing smoking and has no interest in stopping still   \par \par 4) Non compliance:  repeatedly as noted over years... f/u infrequent ov, failed to secure PCP- still - studies not done as requested. \par  Remain concerned about multiple risk factors for CVD, emphasized this today and every visit.. in past also discussed w/  pt and partner... but over time little changes.  \par Admits depression is a factor.. but doesn't feel this is completely the reason and denies depression at this time.  \par \par 5) Osteopenia: last study 10/15/20 w/ most severe T score -1.0 w/ 14% drop from previous study.. still low frax and not tx indicated. but now presents with new vertebral fracture L1 severe pain now- given IM toradol \par No  previous tx \par Risk factors: long term steroid use, smoking\par \par 6) Cervical radiculopathy/ myofascial pain: doing well lately, much better with less stress \par  xrays 2/20 w/ cervical straightening  of cervical spine and if not better w/ steroids/ muscle relaxants will get MRI- all sx better with better\par \par 7) Regional pain:  nothing today.. \par  RGTB: in past  given IACS 80 mg 1/21 w/ excellent results, no return.  \par 2019..  L GTB ++:  s/p IA injection and toradol  w/ ++ relief .. in past doing well now\par  \par Vaccination: \par - annual flu vaccine annually \par - Pneumonia 23 4 ys ago\par - Prevnar 13 needed\par - Sars-Cov-2 Vaccine completed.. will check antibody levels\par \par \par \par Plan:\par - labs done today\par \par - ask Dr Hobbs about kyphoplasty or vertebroplasty \par \par - continue fs  Cellcept (MMF)  but need to consider additional therapy, struggling to lower pred and rash continues..\par \par - Use  PRN cyclobenzaprine  20  mg daily at HS for severe neck or lower back spasms. \par \par - meloxicam 15 mg daily as needed for 2 wks or Ibuprofen 600 mg 3 x daily one or the other\par \par - consider Evusheld monoclonal antibodies for COVID protection.  \par \par - Call if you get COVID and we will also consider giving you anti viral therapy \par \par - again recommend need a primary care:  Call Catalina Clemens  [phone].  If you have trouble getting appt let me know still recommended .. still \par \par - Prevnar 13 next visit!!!  or consider Prevar 20 \par \par - need thyroid US and Bone density\par \par - recommend continued work from home, disease activity  better controlled then ever!  On lowest dose steroids noted and tolerated in past 10 ys \par \par - rto 3-4 m \par \par - as always, you should consider decreasing cigarette smoking but not willing  to consider at this time- especially now with increase in raynauds sx  \par \par - continue to lower prednisone no faster than 1 mg every 2 weeks \par \par - ok to continue with 200 mg trazodone\par \par - Make appt with pulm for:  Sleep study  and PFTs need updated and CTscan ... have repeated this request. Make pulm appt, Dr. Gonzalez \par [phone]\par \par . \par \par

## 2022-03-24 ENCOUNTER — NON-APPOINTMENT (OUTPATIENT)
Age: 57
End: 2022-03-24

## 2022-03-27 LAB
25(OH)D3 SERPL-MCNC: 86 NG/ML
APPEARANCE: CLEAR
BILIRUBIN URINE: NEGATIVE
BLOOD URINE: NEGATIVE
C3 SERPL-MCNC: 144 MG/DL
C4 SERPL-MCNC: 31 MG/DL
CALCIUM SERPL-MCNC: 9.9 MG/DL
CK SERPL-CCNC: 64 U/L
COLOR: NORMAL
COVID-19 SPIKE DOMAIN ANTIBODY INTERPRETATION: POSITIVE
CREAT SPEC-SCNC: 17 MG/DL
CREAT/PROT UR: 0.3 RATIO
CRP SERPL-MCNC: 11 MG/L
DSDNA AB SER-ACNC: 13 IU/ML
ERYTHROCYTE [SEDIMENTATION RATE] IN BLOOD BY WESTERGREN METHOD: 32 MM/HR
GLUCOSE QUALITATIVE U: NEGATIVE
KETONES URINE: NEGATIVE
LEUKOCYTE ESTERASE URINE: ABNORMAL
NITRITE URINE: NEGATIVE
PARATHYROID HORMONE INTACT: 19 PG/ML
PH URINE: 6.5
PROT UR-MCNC: 5 MG/DL
PROTEIN URINE: NEGATIVE
SARS-COV-2 AB SERPL IA-ACNC: >250 U/ML
SPECIFIC GRAVITY URINE: 1.01
THYROGLOB AB SERPL-ACNC: <20 IU/ML
THYROPEROXIDASE AB SERPL IA-ACNC: <10 IU/ML
TSH SERPL-ACNC: 1.56 UIU/ML
UROBILINOGEN URINE: NORMAL

## 2022-04-13 LAB
ALBUMIN MFR SERPL ELPH: 54 %
ALBUMIN SERPL-MCNC: 3.9 G/DL
ALBUMIN/GLOB SERPL: 1.1 RATIO
ALPHA1 GLOB MFR SERPL ELPH: 10.2 %
ALPHA1 GLOB SERPL ELPH-MCNC: 0.7 G/DL
ALPHA2 GLOB MFR SERPL ELPH: 13.9 %
ALPHA2 GLOB SERPL ELPH-MCNC: 1 G/DL
B-GLOBULIN MFR SERPL ELPH: 10.7 %
B-GLOBULIN SERPL ELPH-MCNC: 0.8 G/DL
GAMMA GLOB FLD ELPH-MCNC: 0.8 G/DL
GAMMA GLOB MFR SERPL ELPH: 11.2 %
INTERPRETATION SERPL IEP-IMP: NORMAL
PROT SERPL-MCNC: 7.3 G/DL
PROT SERPL-MCNC: 7.3 G/DL

## 2022-04-21 ENCOUNTER — APPOINTMENT (OUTPATIENT)
Dept: RHEUMATOLOGY | Facility: CLINIC | Age: 57
End: 2022-04-21
Payer: COMMERCIAL

## 2022-04-21 PROCEDURE — 99442: CPT

## 2022-05-12 ENCOUNTER — APPOINTMENT (OUTPATIENT)
Dept: SURGERY | Facility: CLINIC | Age: 57
End: 2022-05-12
Payer: COMMERCIAL

## 2022-05-12 DIAGNOSIS — E07.9 DISORDER OF THYROID, UNSPECIFIED: ICD-10-CM

## 2022-05-12 DIAGNOSIS — E04.1 NONTOXIC SINGLE THYROID NODULE: ICD-10-CM

## 2022-05-12 PROCEDURE — 99203 OFFICE O/P NEW LOW 30 MIN: CPT

## 2022-05-12 NOTE — REASON FOR VISIT
[Initial Consultation] : an initial consultation for [Spouse] : spouse [FreeTextEntry2] : a thyroid nodule

## 2022-05-12 NOTE — PHYSICAL EXAM
[Midline] : located in midline position [Normal] : orientation to person, place, and time: normal [FreeTextEntry1] : wearing back brace 2/2 healing lumbar compression fracture [de-identified] : Extremities: COURTNEY x 4.   Skin: No obvious skin lesions.   Voice: clear

## 2022-06-29 ENCOUNTER — LABORATORY RESULT (OUTPATIENT)
Age: 57
End: 2022-06-29

## 2022-06-29 ENCOUNTER — APPOINTMENT (OUTPATIENT)
Dept: RHEUMATOLOGY | Facility: CLINIC | Age: 57
End: 2022-06-29

## 2022-06-29 VITALS
OXYGEN SATURATION: 95 % | HEART RATE: 77 BPM | BODY MASS INDEX: 28 KG/M2 | WEIGHT: 158 LBS | HEIGHT: 63 IN | DIASTOLIC BLOOD PRESSURE: 80 MMHG | TEMPERATURE: 97.1 F | SYSTOLIC BLOOD PRESSURE: 118 MMHG

## 2022-06-29 DIAGNOSIS — M70.62 TROCHANTERIC BURSITIS, LEFT HIP: ICD-10-CM

## 2022-06-29 PROCEDURE — 99214 OFFICE O/P EST MOD 30 MIN: CPT

## 2022-06-29 RX ORDER — AZITHROMYCIN 250 MG/1
250 TABLET, FILM COATED ORAL
Qty: 6 | Refills: 0 | Status: DISCONTINUED | COMMUNITY
Start: 2021-11-08 | End: 2022-06-29

## 2022-06-29 RX ORDER — MELOXICAM 15 MG/1
15 TABLET ORAL DAILY
Qty: 1 | Refills: 1 | Status: DISCONTINUED | COMMUNITY
Start: 2022-03-20 | End: 2022-06-29

## 2022-06-29 RX ORDER — PREDNISONE 1 MG/1
1 TABLET ORAL
Qty: 360 | Refills: 1 | Status: DISCONTINUED | COMMUNITY
Start: 2018-11-06 | End: 2022-06-29

## 2022-06-29 RX ORDER — CYCLOBENZAPRINE HYDROCHLORIDE 10 MG/1
10 TABLET, FILM COATED ORAL
Qty: 90 | Refills: 2 | Status: DISCONTINUED | COMMUNITY
Start: 2022-03-20 | End: 2022-06-29

## 2022-06-29 RX ORDER — OXYCODONE AND ACETAMINOPHEN 10; 325 MG/1; MG/1
10-325 TABLET ORAL
Qty: 4 | Refills: 0 | Status: DISCONTINUED | COMMUNITY
Start: 2022-03-20 | End: 2022-06-29

## 2022-06-30 NOTE — REVIEW OF SYSTEMS
[Red Eyes] : red eyes [Dry Eyes] : dryness of the eyes [Hoarseness] : hoarseness [Wheezing] : wheezing [Cough] : cough [Arthralgias] : arthralgias [Joint Pain] : joint pain [Skin Lesions] : skin lesion [Sleep Disturbances] : sleep disturbances [Depression] : depression [As Noted in HPI] : as noted in HPI [Negative] : Heme/Lymph [Feeling Poorly] : not feeling poorly [Feeling Tired] : not feeling tired [Eye Pain] : no eye pain [SOB on Exertion] : no shortness of breath during exertion [Heartburn] : no heartburn [FreeTextEntry2] : fatigue- chronic  [FreeTextEntry3] : stable- no visual disturbance [FreeTextEntry6] : mild intermittent [FreeTextEntry7] : nausea w/ MMF better now but still present [de-identified] : see HPI- facial rash, subacute cutaneous lesions on underside of R arm- new lesions on hands/ forearms  [FreeTextEntry9] :  hand pain and L arm, L lateral hip greatly improved now; back pain greatly improved [de-identified] :  trouble sleeping without stable dose trazodone- better now on higher dose ... tapered down venlaxifine by 50% and doing well

## 2022-06-30 NOTE — PHYSICAL EXAM
[General Appearance - Alert] : alert [General Appearance - In No Acute Distress] : in no acute distress [General Appearance - Well Nourished] : well nourished [General Appearance - Well Developed] : well developed [General Appearance - Well-Appearing] : healthy appearing [PERRL With Normal Accommodation] : pupils were equal in size, round, and reactive to light [Extraocular Movements] : extraocular movements were intact [Outer Ear] : the ears and nose were normal in appearance [Oropharynx] : the oropharynx was normal [] : no respiratory distress [Auscultation Breath Sounds / Voice Sounds] : lungs were clear to auscultation bilaterally [Heart Rate And Rhythm] : heart rate was normal and rhythm regular [Heart Sounds] : normal S1 and S2 [Heart Sounds Gallop] : no gallops [Murmurs] : no murmurs [Heart Sounds Pericardial Friction Rub] : no pericardial rub [Arterial Pulses Carotid] : carotid pulses were normal with no bruits [Full Pulse] : the pedal pulses are present [Edema] : there was no peripheral edema [Cervical Lymph Nodes Enlarged Posterior Bilaterally] : posterior cervical [Cervical Lymph Nodes Enlarged Anterior Bilaterally] : anterior cervical [Supraclavicular Lymph Nodes Enlarged Bilaterally] : supraclavicular [Axillary Lymph Nodes Enlarged Bilaterally] : axillary [Abnormal Walk] : normal gait [Nail Clubbing] : no clubbing  or cyanosis of the fingernails [Motor Tone] : muscle strength and tone were normal [Sensation] : the sensory exam was normal to light touch and pinprick [Motor Exam] : the motor exam was normal [No Focal Deficits] : no focal deficits [Oriented To Time, Place, And Person] : oriented to person, place, and time [Impaired Insight] : insight and judgment were intact [Affect] : the affect was normal [No CVA Tenderness] : no ~M costovertebral angle tenderness [No Spinal Tenderness] : no spinal tenderness [FreeTextEntry1] :  calm, appropriate

## 2022-06-30 NOTE — ASSESSMENT
[FreeTextEntry1] : 58 yo wf with longstanding SLE/ DM sine myositis, HLD, Depression, recovering ETOH and recently dx w/ intraductal papilloma R breast s/p lumpectomy- no additional tx needed \par \par \par 1) SLE/ DM sine myositis: high titer MELLY, + SSA > 8,  dsDNA+ (intermittently) , + RNP in past, nothing + in past few ys, Bx + for SLE- ? discoid and/ or subacute cutaneous several new lesions. NEed to return to derm for repeat bx\par Disease activity- significant photosensitive rash on face, arms, hands.. dx + DM (2014- no bx- Dr. Martin), Ongoing chronic fatigue and intermittent synovitis- not appreciated on exam\par Updated labs on lower pred 4 mg, now ESR 60 w/ nl CK, UPCR, pending C3/4 and dsDNA.  COntinues fs MMF.   . Now working w/ Dr. Teran. \par Appt with pulm - Dr Villalta tomorrow.. mild intermittent wheezing, crackles, clear today \par For now continue current tx .. updated pulm/ derm eval priority and likely need to add to regimen (HCQ, AZA or if rash determined to be SCLE- would consider anafrolimab. \par No myositis now or in past\par Updated PFTs still needed, CT chest and Echo 2019 -> 3/22 stable- improved,\par NOTE: \par - elixer MMF not better tolerated, nausea at times considerable \par - Failed to control w/ HCQ alone on low dose steroids x many ys, added Benlysta, tapered off steroids but after 1 yr, refused to continue- was not necessarily better. \par - 2014 seen by Say Phillips  for derm eval dx facial rash as DM but no bx.  \par - skin bx ys ago Dr. Perea + SLE no details available many ys ago \par \par 2) Depression/ 30+ ys Clean and Sober:  intermittently struggles w/ feelings of depression but no suicidal ideations-better past yr and decreased venlaxifine to 75 mg once daily, continues to have strong family support\par Fatigue chronically, worse when SLE/ DM not fully controlled but may also have FLOR (recommend pulm eval but not done despite repeated recommendations.  Should do full sleep study!)\par \par 3) CV risk: high given longstanding heavy smoker, chronic inflammatory state (SLE/DM) and long term use Steroids w/ NEEDS PCP repeatedly advised and not done\par - PreDM w/ HbA1c 5.9.. \par -  HLD:  persistently elevated, , TG  209, HDL  60, [id number]- updated orders now repeat.. \par last year and again 4/17 was supposed to start statin, but did not. .\par Noted plaque formation on carotid studies, echo suggested ischemic change but stress test neg.  Cath was not done and continues to deny palpitations, cp, sob, rosas... despite extensive review subtle sx in females.. still denies.\par - Smoking: Each visit discussed long standing smoking and has no interest in stopping still   \par \par 4) Non compliance:  repeatedly as noted over years... f/u infrequent ov, failed to secure PCP- still - studies not done as requested. \par  Remain concerned about multiple risk factors for CVD, emphasized this today and every visit.. in past also discussed w/  pt and partner... but over time little changes.  \par Admits depression is a factor.. but doesn't feel this is completely the reason and denies depression at this time.  \par \par 5) Osteopenia: last study 3/23/22 w/ most severe T score -1.1 at spine with nl density at hips and low risk for fracture 16% overall and 1.5% risk hip fracture despite recent fragility (no trauma) fx of L1 (recognize that DJD spine alters reading at this level- and spine is NOT part of Frax calculation).  \par No  previous tx. Lengthy discussion today, would recommend BP tx.. literature provided will start next visit \par Risk factors: long term steroid use, smoking\par \par 6) Cervical radiculopathy/ myofascial pain: doing well lately, much better with less stress \par  xrays 2/20 w/ cervical straightening  of cervical spine and if not better w/ steroids/ muscle relaxants will get MRI- all sx better with better\par  \par Vaccination: \par - annual flu vaccine annually \par - Pneumonia 23 4 ys ago\par - Prevnar 13 needed\par - Sars-Cov-2 Vaccine completed..excellent antibody response\par \par \par \par Plan:\par - labs done today\par \par - continue fs  Cellcept (MMF)  but need to consider additional therapy, struggling to lower pred and rash continues..\par \par - Use  PRN cyclobenzaprine  20  mg daily at HS for severe neck or lower back spasms. \par \par - meloxicam 15 mg daily as needed for 2 wks or Ibuprofen 600 mg 3 x daily one or the other\par \par - consider Evusheld monoclonal antibodies for COVID protection\par \par - needs tx for bone loss.. would start oral BP and switch to Reclast if tolerated \par \par - Call if you get COVID and we will also consider giving you anti viral therapy \par \par - again recommend need a primary care:  Call Catalina Clemens  [phone].  If you have trouble getting appt let me know still recommended .. still \par \par - Prevnar 13 next visit!!!  or consider Prevar 20 \par \par - need thyroid US next yr f/u with ENT 3/23 \par \par - recommend continued work from home, disease activity  better controlled then ever!  On lowest dose steroids noted and tolerated in past 10 ys \par \par - rto 3-4 m \par \par - as always, you should consider decreasing cigarette smoking\par \par - continue to lower prednisone no faster than 1 mg every 2 weeks .. but likely need to add another DMARD.. updated derm eval to confirm:  DM vs ScLE  or other.. \par \par - ok to continue with 200 mg trazodone\par \par - Make appt with pulm for:  Sleep study  and PFTs need updated and CTscan ...NEEDED NOW! \par  \par \par

## 2022-06-30 NOTE — HISTORY OF PRESENT ILLNESS
[FreeTextEntry1] : 6/29/22\par - following vertebral fracture tapering prednisone, now at 4 mg and notes progressive worsening of rash- photosensitive.. notes several new plaquelike lesions on hands \par - back pain persists, did not get kyphoplasty/ vertebroplasty.. bracing and limitations.  Pain greatly improved but now deconditioned.. .. activity still limited not able to lift more than 5 lbs and starting to move better .. since 3/20/22.  Fatigue continues to be poor.. does snore.. and not willing to get Sleep study.. despite c/o over many years\par - breathing unchanged, appt with pulm next wk \par - still no primary care provider, scheduled to see someone one\par - thyroid nodule wait and watch, no sx indicated. (following with endo/ ENT)  \par - still smoking 1 ppd (down 50%)\par  \par \par 1) SLE/ DM sine myositis: high titer MELLY, dsDNA+ (intermittently) , + RNP (repeat neg), SSA+ > 8  \par \par 2) Depression/ 27 ys Clean and Sober: \par \par 3) Health mnt:\par Smoking- active > 1 PPD x 30+ ys \par HLD:  persistently elevated \par tchol 274\par Trigy  209\par HDL  60\par [id number]\par last year and again 4/17 was supposed to start statin, but did not.

## 2022-07-06 ENCOUNTER — APPOINTMENT (OUTPATIENT)
Dept: DERMATOLOGY | Facility: CLINIC | Age: 57
End: 2022-07-06

## 2022-07-06 DIAGNOSIS — D23.9 OTHER BENIGN NEOPLASM OF SKIN, UNSPECIFIED: ICD-10-CM

## 2022-07-06 PROCEDURE — 99213 OFFICE O/P EST LOW 20 MIN: CPT | Mod: 25

## 2022-07-06 PROCEDURE — 11105 PUNCH BX SKIN EA SEP/ADDL: CPT

## 2022-07-06 PROCEDURE — 11104 PUNCH BX SKIN SINGLE LESION: CPT

## 2022-07-06 NOTE — PHYSICAL EXAM
[Alert] : alert [Oriented x 3] : ~L oriented x 3 [Well Nourished] : well nourished [FreeTextEntry3] : Erythematous scaling patches, some lichenification, of the left hand MCP's (trace on the right side).\par \par Erythematous blanching macules, some with trace induration, extensively on the bilateral extensor forearms, extending to the dorsal hands.  Trace scale x 1 on the right dorsal hand.\par \par Violaceous plaque with thick scale of the right upper arm.\par Trace scaling and erythematous patch, left elbow.\par Nail polish on all fingers and does.\par \par Nail folds with mild erythema.  No edema.\par \par \par Hyperpigmented erythematous barely elevated papule with positive dimple sign, c/w a dermatofibroma on the left shin.\par \par

## 2022-07-06 NOTE — HISTORY OF PRESENT ILLNESS
[FreeTextEntry1] : Progressive rash - fit in after evaluation by rheumatology. [de-identified] : Patient with eruption on the hands and forearms over weeks or longer.  No pain or tenderness but with some irritation.  No self tx.\par Patient with r/o SLE/DM sine myositis, with positive MELLY, SSA, dsDNA

## 2022-07-06 NOTE — ASSESSMENT
[FreeTextEntry1] : DF - left shin.\par Benign.\par \par Psoriasiform dermatitis of the left hands - r/o psoriasis vs. dermatomyositis vs. LE.\par R/O LE of the forearms.\par

## 2022-07-13 ENCOUNTER — APPOINTMENT (OUTPATIENT)
Dept: DERMATOLOGY | Facility: CLINIC | Age: 57
End: 2022-07-13

## 2022-07-13 DIAGNOSIS — R21 RASH AND OTHER NONSPECIFIC SKIN ERUPTION: ICD-10-CM

## 2022-07-13 PROCEDURE — 99214 OFFICE O/P EST MOD 30 MIN: CPT

## 2022-07-13 NOTE — ASSESSMENT
[FreeTextEntry1] : Dog bite\par Education - extensive.\par Patient leaving in 2 days to go camping for 1 week.\par doxy 100mg bid x 1 week.\par Bactroban ointment tid - keep dressed.\par Discussed local wound care.\par Discussed the potential severity of bites - if any progression in symptoms or progressive redness, patient should be seen in an ER near her camping location.

## 2022-07-13 NOTE — HISTORY OF PRESENT ILLNESS
[FreeTextEntry1] : Suture removal. [de-identified] : Left forearm and left hand, both healing well, without evidence of infection.\par Sutures all removed.  Sites dressed.\par Further wound care discussed.\par Path still pending - will call patient when path is available.\par \par Patient with dog bite 3 days ago to the left calf.  Painful, but without progression in symptoms and of rash/redness.

## 2022-07-13 NOTE — PHYSICAL EXAM
Reason for Call:  Other - Referral: Speech Therapy    Detailed comments: Father called to request another referral for patient's speech therapist. Please call Father once referral is placed or if any questions    Phone Number Patient can be reached at: Home number on file [phone] (home)    Best Time: Anytime    Can we leave a detailed message on this number? YES    Call taken on 2/19/2018 at 3:00 PM by Connie Shook       [FreeTextEntry3] : Left calf with oval 5 cm patch with few dusky superficially dusky macules.  Surrounding bruising.  Mild erythema.  No purulence.  No exudate.

## 2022-07-24 LAB
ALBUMIN MFR SERPL ELPH: 61.5 %
ALBUMIN SERPL ELPH-MCNC: 4.7 G/DL
ALBUMIN SERPL-MCNC: 4.6 G/DL
ALBUMIN/GLOB SERPL: 1.6 RATIO
ALP BLD-CCNC: 82 U/L
ALPHA1 GLOB MFR SERPL ELPH: 4.5 %
ALPHA1 GLOB SERPL ELPH-MCNC: 0.3 G/DL
ALPHA2 GLOB MFR SERPL ELPH: 12.4 %
ALPHA2 GLOB SERPL ELPH-MCNC: 0.9 G/DL
ALT SERPL-CCNC: 17 U/L
ANION GAP SERPL CALC-SCNC: 14 MMOL/L
APPEARANCE: CLEAR
AST SERPL-CCNC: 20 U/L
B-GLOBULIN MFR SERPL ELPH: 10.4 %
B-GLOBULIN SERPL ELPH-MCNC: 0.8 G/DL
BASOPHILS # BLD AUTO: 0.03 K/UL
BASOPHILS NFR BLD AUTO: 0.6 %
BILIRUB SERPL-MCNC: 0.4 MG/DL
BILIRUBIN URINE: NEGATIVE
BLOOD URINE: NEGATIVE
BUN SERPL-MCNC: 15 MG/DL
C3 SERPL-MCNC: 137 MG/DL
C4 SERPL-MCNC: 31 MG/DL
CALCIUM SERPL-MCNC: 10.4 MG/DL
CCP AB SER IA-ACNC: <8 UNITS
CHLORIDE SERPL-SCNC: 100 MMOL/L
CK SERPL-CCNC: 62 U/L
CO2 SERPL-SCNC: 24 MMOL/L
COLOR: NORMAL
CREAT SERPL-MCNC: 0.65 MG/DL
CREAT SPEC-SCNC: 34 MG/DL
CREAT/PROT UR: 0.1 RATIO
CRP SERPL-MCNC: 7 MG/L
DSDNA AB SER-ACNC: 18 IU/ML
EGFR: 103 ML/MIN/1.73M2
ENA RNP AB SER IA-ACNC: 0.2 AL
ENA SM AB SER IA-ACNC: <0.2 AL
ENA SS-A AB SER IA-ACNC: >8 AL
ENA SS-B AB SER IA-ACNC: <0.2 AL
EOSINOPHIL # BLD AUTO: 0.04 K/UL
EOSINOPHIL NFR BLD AUTO: 0.8 %
ERYTHROCYTE [SEDIMENTATION RATE] IN BLOOD BY WESTERGREN METHOD: 60 MM/HR
GAMMA GLOB FLD ELPH-MCNC: 0.8 G/DL
GAMMA GLOB MFR SERPL ELPH: 11.2 %
GLUCOSE QUALITATIVE U: NEGATIVE
GLUCOSE SERPL-MCNC: 93 MG/DL
HAV IGM SER QL: NONREACTIVE
HBV CORE IGM SER QL: NONREACTIVE
HBV SURFACE AG SER QL: NONREACTIVE
HCT VFR BLD CALC: 45.7 %
HCV AB SER QL: NONREACTIVE
HCV S/CO RATIO: 0.11 S/CO
HGB BLD-MCNC: 14.7 G/DL
IMM GRANULOCYTES NFR BLD AUTO: 0 %
INTERPRETATION SERPL IEP-IMP: NORMAL
KETONES URINE: NEGATIVE
LEUKOCYTE ESTERASE URINE: ABNORMAL
LYMPHOCYTES # BLD AUTO: 0.85 K/UL
LYMPHOCYTES NFR BLD AUTO: 16.8 %
MAN DIFF?: NORMAL
MCHC RBC-ENTMCNC: 28.5 PG
MCHC RBC-ENTMCNC: 32.2 GM/DL
MCV RBC AUTO: 88.6 FL
MONOCYTES # BLD AUTO: 0.24 K/UL
MONOCYTES NFR BLD AUTO: 4.7 %
NEUTROPHILS # BLD AUTO: 3.9 K/UL
NEUTROPHILS NFR BLD AUTO: 77.1 %
NITRITE URINE: NEGATIVE
PH URINE: 6
PLATELET # BLD AUTO: 259 K/UL
POTASSIUM SERPL-SCNC: 5 MMOL/L
PROT SERPL-MCNC: 7.5 G/DL
PROT UR-MCNC: 4 MG/DL
PROTEIN URINE: NEGATIVE
RBC # BLD: 5.16 M/UL
RBC # FLD: 13.4 %
RF+CCP IGG SER-IMP: NEGATIVE
SODIUM SERPL-SCNC: 139 MMOL/L
SPECIFIC GRAVITY URINE: 1.01
TSH SERPL-ACNC: 1.39 UIU/ML
UROBILINOGEN URINE: NORMAL
WBC # FLD AUTO: 5.06 K/UL

## 2022-07-26 ENCOUNTER — APPOINTMENT (OUTPATIENT)
Dept: INTERNAL MEDICINE | Facility: CLINIC | Age: 57
End: 2022-07-26

## 2022-07-26 ENCOUNTER — NON-APPOINTMENT (OUTPATIENT)
Age: 57
End: 2022-07-26

## 2022-07-26 VITALS
OXYGEN SATURATION: 97 % | BODY MASS INDEX: 28 KG/M2 | TEMPERATURE: 99.2 F | SYSTOLIC BLOOD PRESSURE: 140 MMHG | RESPIRATION RATE: 16 BRPM | HEIGHT: 63 IN | WEIGHT: 158 LBS | HEART RATE: 79 BPM | DIASTOLIC BLOOD PRESSURE: 70 MMHG

## 2022-07-26 DIAGNOSIS — J45.909 UNSPECIFIED ASTHMA, UNCOMPLICATED: ICD-10-CM

## 2022-07-26 PROCEDURE — 94060 EVALUATION OF WHEEZING: CPT

## 2022-07-26 PROCEDURE — 99214 OFFICE O/P EST MOD 30 MIN: CPT | Mod: 25

## 2022-07-26 RX ORDER — IBUPROFEN 600 MG/1
600 TABLET, FILM COATED ORAL
Qty: 60 | Refills: 0 | Status: DISCONTINUED | COMMUNITY
Start: 2022-04-01 | End: 2022-07-26

## 2022-07-26 RX ORDER — OXYCODONE AND ACETAMINOPHEN 5; 325 MG/1; MG/1
5-325 TABLET ORAL
Qty: 16 | Refills: 0 | Status: DISCONTINUED | COMMUNITY
Start: 2022-03-21 | End: 2022-07-26

## 2022-07-26 RX ORDER — OXYCODONE 5 MG/1
5 TABLET ORAL
Qty: 40 | Refills: 0 | Status: DISCONTINUED | COMMUNITY
Start: 2022-04-01 | End: 2022-07-26

## 2022-07-26 NOTE — PROCEDURE
[FreeTextEntry1] : Spirometry pre-and postbronchodilator therapy shows normal flow rates. FEV1 is 2.47 L which is 97% predicted.

## 2022-07-26 NOTE — HISTORY OF PRESENT ILLNESS
[TextBox_4] : Patient presents for followup evaluation. She has a history of systemic lupus erythematosus. She is currently on CellCept therapy. She was last seen in this office on 12/22/21. She has not been using Symbicort metered dose inhaler on a regular basis. She has albuterol metered-dose inhaler that she also does not use regularly. She denies significant shortness of breath.

## 2022-07-27 ENCOUNTER — APPOINTMENT (OUTPATIENT)
Dept: DERMATOLOGY | Facility: CLINIC | Age: 57
End: 2022-07-27
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
## 2022-07-27 PROCEDURE — 99214 OFFICE O/P EST MOD 30 MIN: CPT

## 2022-07-27 NOTE — ASSESSMENT
[FreeTextEntry1] : Dog bite\par Improved.\par No evidence of infection.\par Vasellne and dressings recommended until healed.\par Discussed at length.\par \par Rash on the hands  -  discussed pathology results at length.\par Most likely a part of her SLE.  I do not believe this is DM.\par Elocon cream bid prn.\par Discussed high number sunscreens, and the need for strict sun protection.\par Systemic medications as per rheumatology.\par f/u prn.

## 2022-07-27 NOTE — HISTORY OF PRESENT ILLNESS
[FreeTextEntry1] : Patient for dog bite and to discuss path results. [de-identified] : She notes left calf with persistent wound from dog bite.  Some clear drainage.  Black crusting recently fell off.  Minimal tenderness.

## 2022-07-27 NOTE — PHYSICAL EXAM
[Alert] : alert [Oriented x 3] : ~L oriented x 3 [Well Nourished] : well nourished [FreeTextEntry3] : Left calf with two ulcers, largest approx. 8 mm, smaller approx. 5 mm.  Both with serous drainage.  Some depigmented scars adjacent.  Mild erythema surrounding the wounds, but not extending significantly beyond.  No tenderness of the calf, other than just on the wound itself.\par \par Erythematous patches with mild scale, left > right dorsal hand, mostly over the MCP's and on the digits.

## 2022-08-04 ENCOUNTER — APPOINTMENT (OUTPATIENT)
Dept: RHEUMATOLOGY | Facility: CLINIC | Age: 57
End: 2022-08-04

## 2022-08-04 PROCEDURE — 99214 OFFICE O/P EST MOD 30 MIN: CPT | Mod: 95

## 2022-08-04 RX ORDER — MYCOPHENOLATE MOFETIL 500 MG/1
500 TABLET ORAL
Qty: 540 | Refills: 1 | Status: DISCONTINUED | COMMUNITY
Start: 2017-06-16 | End: 2022-08-04

## 2022-08-04 NOTE — REVIEW OF SYSTEMS
[Red Eyes] : red eyes [Dry Eyes] : dryness of the eyes [Hoarseness] : hoarseness [Wheezing] : wheezing [Cough] : cough [Arthralgias] : arthralgias [Joint Pain] : joint pain [Skin Lesions] : skin lesion [Sleep Disturbances] : sleep disturbances [Depression] : depression [As Noted in HPI] : as noted in HPI [Negative] : Heme/Lymph [Feeling Poorly] : not feeling poorly [Feeling Tired] : not feeling tired [Eye Pain] : no eye pain [SOB on Exertion] : no shortness of breath during exertion [Heartburn] : no heartburn [FreeTextEntry2] : fatigue- chronic  [FreeTextEntry3] : stable- no visual disturbance [FreeTextEntry6] : mild intermittent [FreeTextEntry7] : nausea w/ MMF better now but still present [FreeTextEntry9] :  hand pain and L arm, L lateral hip greatly improved now; back pain greatly improved [de-identified] : see HPI- facial rash, subacute cutaneous lesions on underside of R arm- new lesions on hands/ forearms - and wound from dog bite 7/10/22 [de-identified] :  trouble sleeping without stable dose trazodone- better now on higher dose ... tapered down venlaxifine by 50% and doing well

## 2022-08-04 NOTE — ASSESSMENT
[FreeTextEntry1] : 58 yo wf with longstanding SLE/ DM sine myositis, HLD, Depression, recovering ETOH and recently dx w/ intraductal papilloma R breast s/p lumpectomy- no additional tx needed \par \par \par 1) SLE/ DM sine myositis: high titer MELLY, + SSA > 8,  dsDNA+ (intermittently) , + RNP in past, nothing + in past few ys, Bx + for SLE- ? discoid and/ or subacute cutaneous several new lesions.  2 different bx c/w SLE.. less likely DM.. recommend following tx for SLE to treat. \par Concerned about non healing dog bite and use of MMF - additionally little improvement in skin despite fs... will stop for now and retry HCQ.. (previous severe GI distress- only able to tolerate brand and only when taken at HS)... discussed use of Saphnelo... but needs to address dog bite first.  \par Labs from 6/22 suggest active systemic inflammation with ESR 60 and CRP 7.  \par Given topicals and again emphasized need to avoid sun exposure (has never been good at this) ... Now working w/ Dr. Teran. \par Updated CTscan pending, spirometry stable, still smoking \par No myositis now or in past\par Updated PFTs still needed, CT chest and Echo 2019 -> 3/22 stable- improved,\par NOTE: \par - elixer MMF not better tolerated, nausea at times considerable \par - Failed to control w/ HCQ alone on low dose steroids x many ys, added Benlysta, tapered off steroids but after 1 yr, refused to continue- was not necessarily better. \par - 2014 seen by Say Phillips  for derm eval dx facial rash as DM but no bx.  \par - skin bx ys ago Dr. Perea + SLE no details available many ys ago \par \par 2) Depression/ 30+ ys Clean and Sober:  intermittently struggles w/ feelings of depression but no suicidal ideations-better past yr and decreased venlaxifine to 75 mg once daily, continues to have strong family support\par Fatigue chronically, worse when SLE/ DM not fully controlled but may also have FLOR (recommend pulm eval but not done despite repeated recommendations.  Should do full sleep study!)\par \par 3) CV risk: high given longstanding heavy smoker, chronic inflammatory state (SLE/DM) and long term use Steroids w/ NEEDS PCP repeatedly advised and not done\par - PreDM w/ HbA1c 5.9.. \par -  HLD:  persistently elevated, , TG  209, HDL  60, [id number]- updated orders now repeat.. \par last year and again 4/17 was supposed to start statin, but did not. .\par Noted plaque formation on carotid studies, echo suggested ischemic change but stress test neg.  Cath was not done and continues to deny palpitations, cp, sob, rosas... despite extensive review subtle sx in females.. still denies.\par - Smoking: Each visit discussed long standing smoking and has no interest in stopping still   \par \par 4) Non compliance:  repeatedly as noted over years... f/u infrequent ov, failed to secure PCP- still - studies not done as requested. \par  Remain concerned about multiple risk factors for CVD, emphasized this today and every visit.. in past also discussed w/  pt and partner... but over time little changes.  \par Admits depression is a factor.. but doesn't feel this is completely the reason and denies depression at this time.  \par \par 5) Osteopenia: last study 3/23/22 w/ most severe T score -1.1 at spine with nl density at hips and low risk for fracture 16% overall and 1.5% risk hip fracture despite recent fragility (no trauma) fx of L1 (recognize that DJD spine alters reading at this level- and spine is NOT part of Frax calculation).  \par No  previous tx. Lengthy discussion today, would recommend BP tx.. literature provided will start next visit \par Risk factors: long term steroid use, smoking\par \par 6) Cervical radiculopathy/ myofascial pain: doing well lately, much better with less stress \par  xrays 2/20 w/ cervical straightening  of cervical spine and if not better w/ steroids/ muscle relaxants will get MRI- all sx better with better\par \par 7) Dog bite: since 7/10/22, not healing well still deep wound with local redness despite doxy.. refer to wound care center ? concerned about use of MMF- will switch not to HCQ and mnt pred 4 mg (no higher) \par  \par Vaccination: \par - annual flu vaccine annually \par - Pneumonia 23 4 ys ago\par - Prevnar 13 needed\par - Sars-Cov-2 Vaccine completed..excellent antibody response\par \par \par \par Plan:\par \par - stop   Cellcept (MMF)  \par \par - restart Plaquenil (brand only - ISABELA), HCQ not tolerated in past repeatedly tried \par \par - Use  PRN cyclobenzaprine  20  mg daily at HS for severe neck or lower back spasms. \par \par - meloxicam 15 mg daily as needed for 2 wks or Ibuprofen 600 mg 3 x daily one or the other\par \par - consider Evusheld monoclonal antibodies for COVID protection- is not interested \par \par - needs tx for bone loss.. would start oral BP and switch to Reclast if tolerated \par \par - Call if you get COVID and we will also consider giving you anti viral therapy \par \par - again recommend need a primary care:  Call Catalina Clemens  [phone].  If you have trouble getting appt let me know still recommended .. still \par \par - Prevnar 13 next visit!!!  or consider Prevar 20 \par \par - need thyroid US next yr f/u with ENT 3/23 \par \par - recommend continued work from home, disease activity  better controlled then ever!  On lowest dose steroids noted and tolerated in past 10 ys \par \par - rto 3-4 m, TEB 1 m\par \par - referral to wound care  \par \par - as always, you should consider decreasing cigarette smoking\par \par - continue to lower prednisone no faster than 1 mg every 2 weeks .. but likely need to add another DMARD.. updated derm eval to confirm:  suggests actually ScLE  . \par \par - ok to continue with 200 mg trazodone\par \par - Make appt with pulm for:  Sleep study  and PFTs need updated and CTscan ...NEEDED NOW! \par  \par \par

## 2022-08-04 NOTE — PHYSICAL EXAM
[General Appearance - Alert] : alert [General Appearance - In No Acute Distress] : in no acute distress [General Appearance - Well Nourished] : well nourished [General Appearance - Well Developed] : well developed [General Appearance - Well-Appearing] : healthy appearing [Outer Ear] : the ears and nose were normal in appearance [] : no respiratory distress [Auscultation Breath Sounds / Voice Sounds] : lungs were clear to auscultation bilaterally [Heart Rate And Rhythm] : heart rate was normal and rhythm regular [Heart Sounds] : normal S1 and S2 [Heart Sounds Gallop] : no gallops [Murmurs] : no murmurs [Heart Sounds Pericardial Friction Rub] : no pericardial rub [Arterial Pulses Carotid] : carotid pulses were normal with no bruits [Full Pulse] : the pedal pulses are present [Edema] : there was no peripheral edema [Cervical Lymph Nodes Enlarged Posterior Bilaterally] : posterior cervical [Cervical Lymph Nodes Enlarged Anterior Bilaterally] : anterior cervical [Supraclavicular Lymph Nodes Enlarged Bilaterally] : supraclavicular [Axillary Lymph Nodes Enlarged Bilaterally] : axillary [No CVA Tenderness] : no ~M costovertebral angle tenderness [No Spinal Tenderness] : no spinal tenderness [Abnormal Walk] : normal gait [Nail Clubbing] : no clubbing  or cyanosis of the fingernails [Motor Tone] : muscle strength and tone were normal [Motor Exam] : the motor exam was normal [No Focal Deficits] : no focal deficits [Oriented To Time, Place, And Person] : oriented to person, place, and time [Impaired Insight] : insight and judgment were intact [Affect] : the affect was normal [FreeTextEntry1] :  calm, appropriate

## 2022-08-04 NOTE — HISTORY OF PRESENT ILLNESS
[Home] : at home, [unfilled] , at the time of the visit. [Medical Office: ([facility])___] : at the medical office located in  [Verbal consent obtained from patient] : the patient, [unfilled] [FreeTextEntry1] : Verbal consent given on 08/04/2022 at 17:28 by EMILY CHACON, [].\par Amwell failed, doximity\par \par - updated path eval Dr Teran suggests SLE etiology NOT DM... \par - dog bite continues to be active issue, 4 mg pred.. slow healing.. \par - seen by Anselmi... doesn't feel routine meds necessary, updated spirometry.  Doesn't symbicort necessary and rare need for rescue.. \par - continues to smoke..  1 ppd (down 50%)\par - joint pain / swelling controlled \par - skin continues to be an active problem... \par \par  \par \par 1) SLE/ DM sine myositis: high titer MELLY, dsDNA+ (intermittently) , + RNP (repeat neg), SSA+ > 8  \par \par 2) Depression/ 27 ys Clean and Sober: \par \par 3) Health mnt:\par Smoking- active > 1 PPD x 30+ ys \par HLD:  persistently elevated \par tchol 274\par Trigy  209\par HDL  60\par [id number]\par last year and again 4/17 was supposed to start statin, but did not.

## 2022-08-18 ENCOUNTER — NON-APPOINTMENT (OUTPATIENT)
Age: 57
End: 2022-08-18

## 2022-08-19 ENCOUNTER — OUTPATIENT (OUTPATIENT)
Dept: OUTPATIENT SERVICES | Facility: HOSPITAL | Age: 57
LOS: 1 days | Discharge: HOME | End: 2022-08-19
Payer: COMMERCIAL

## 2022-08-19 ENCOUNTER — APPOINTMENT (OUTPATIENT)
Dept: WOUND CARE | Facility: HOSPITAL | Age: 57
End: 2022-08-19

## 2022-08-19 VITALS
RESPIRATION RATE: 20 BRPM | HEART RATE: 72 BPM | OXYGEN SATURATION: 95 % | TEMPERATURE: 97.6 F | DIASTOLIC BLOOD PRESSURE: 81 MMHG | WEIGHT: 158 LBS | BODY MASS INDEX: 28 KG/M2 | HEIGHT: 63 IN | SYSTOLIC BLOOD PRESSURE: 136 MMHG

## 2022-08-19 DIAGNOSIS — M35.00 SICCA SYNDROME, UNSPECIFIED: ICD-10-CM

## 2022-08-19 DIAGNOSIS — L97.201 NON-PRESSURE CHRONIC ULCER OF UNSPECIFIED CALF LIMITED TO BREAKDOWN OF SKIN: ICD-10-CM

## 2022-08-19 DIAGNOSIS — Z87.09 PERSONAL HISTORY OF OTHER DISEASES OF THE RESPIRATORY SYSTEM: ICD-10-CM

## 2022-08-19 DIAGNOSIS — Z87.81 PERSONAL HISTORY OF (HEALED) TRAUMATIC FRACTURE: ICD-10-CM

## 2022-08-19 DIAGNOSIS — J34.2 DEVIATED NASAL SEPTUM: Chronic | ICD-10-CM

## 2022-08-19 DIAGNOSIS — F17.200 NICOTINE DEPENDENCE, UNSPECIFIED, UNCOMPLICATED: ICD-10-CM

## 2022-08-19 PROCEDURE — G0463: CPT

## 2022-08-19 PROCEDURE — 99203 OFFICE O/P NEW LOW 30 MIN: CPT

## 2022-08-19 RX ORDER — HYDROXYCHLOROQUINE SULFATE 200 MG/1
200 TABLET ORAL
Qty: 180 | Refills: 1 | Status: DISCONTINUED | COMMUNITY
Start: 2022-08-04 | End: 2022-08-19

## 2022-08-19 RX ORDER — BUDESONIDE AND FORMOTEROL FUMARATE DIHYDRATE 80; 4.5 UG/1; UG/1
80-4.5 AEROSOL RESPIRATORY (INHALATION)
Qty: 1 | Refills: 5 | Status: DISCONTINUED | COMMUNITY
Start: 2020-03-10 | End: 2022-08-19

## 2022-08-19 RX ORDER — ALBUTEROL SULFATE 90 UG/1
108 (90 BASE) POWDER, METERED RESPIRATORY (INHALATION) 4 TIMES DAILY
Qty: 1 | Refills: 5 | Status: DISCONTINUED | COMMUNITY
Start: 2020-03-10 | End: 2022-08-19

## 2022-08-19 RX ORDER — BACILLUS COAGULANS/INULIN 1B-250 MG
CAPSULE ORAL DAILY
Refills: 0 | Status: ACTIVE | COMMUNITY

## 2022-08-19 RX ORDER — ASCORBIC ACID 500 MG
TABLET,CHEWABLE ORAL DAILY
Refills: 0 | Status: ACTIVE | COMMUNITY

## 2022-08-20 NOTE — ASSESSMENT
[Verbal] : Verbal [Demo] : Demo [Patient] : Patient [Spouse] : Spouse [Good - alert, interested, motivated] : Good - alert, interested, motivated [Verbalizes knowledge/Understanding] : Verbalizes knowledge/understanding [Dressing changes] : dressing changes [Skin Care] : skin care [Pressure relief] : pressure relief [Signs and symptoms of infection] : sign and symptoms of infection [How and When to Call] : how and when to call [Off-loading] : off-loading [Patient responsibility to plan of care] : patient responsibility to plan of care [] : Yes [Stable] : stable [Home] : Home [Ambulatory] : Ambulatory [FreeTextEntry2] : Alteration in skin integrity- promote optimal skin integrity\par  [FreeTextEntry4] : Dr Douglas/ Photo taken\par Medihoney escribed by Dr Douglas\litzy F/U to [facility] in 2 weeks

## 2022-08-20 NOTE — REVIEW OF SYSTEMS
[Skin Wound] : skin wound [FreeTextEntry5] : PVD s/p right lower leg bypass graft, raynaud [FreeTextEntry6] : Asthma, COPD [de-identified] : left lower leg wound down to skin, subcutaneous tissue, fat [de-identified] : Cervical radiculopathy [de-identified] : Depression [de-identified] : lupus, sjogren's

## 2022-08-20 NOTE — PHYSICAL EXAM
[2+] : left 2+ [Skin Ulcer] : ulcer [de-identified] : A&Ox3, NAD [de-identified] : 5/5 strength in all quadrants bilaterally [de-identified] : left lower leg wound down to skin, subcutaneous tissue, fat [de-identified] : light touch sensation intact bilaterally [FreeTextEntry1] : Left Posterior Leg- thin bridge of epithelium between upper & lower part of wound [FreeTextEntry2] : 2.3 [FreeTextEntry3] : 1.3 [FreeTextEntry4] : 0.5 [de-identified] : Small Serosanguineous [de-identified] : Intact [de-identified] : % [de-identified] : 1-25% [de-identified] : Medihoney/ Dry Dressing & Kerlix [de-identified] : Mechanically cleansed with Sterile Gauze & Normal saline\par  [de-identified] : CIRCULATION\par Dorsalis Pedis: R palpable  L palpable\par Posterior Tibialis: R palpable L palpable\par Extremity Color: Pigmented\par Extremity Temperature: Warm\par Capillary Refill: < 3 seconds bilaterally\par Vascular studies & Vascular Consult not ordered by Dr Douglas related to nature of wound & palpable pulses\par \par  [de-identified] : None [de-identified] : None [de-identified] : Yes

## 2022-08-20 NOTE — HISTORY OF PRESENT ILLNESS
[FreeTextEntry1] : The wound is located on Left Posterior Leg- pt states that on July 10, 2022 she was bitten by a dog & that wound was treated by owners boyfriend 2x a day x 2 days & then pt continued to self treat & then went to her Dermatologist on 07/13/22- pt was prescribed antibiotics x 1 week (completed) & Mupirocin topically- wound was not improving- pt zoomed with Rheumatologist (Dr Oneil) on 08/04/22 & was instructed to stop Celcept & recommended pt make appointment to [facility]

## 2022-08-21 DIAGNOSIS — Z88.8 ALLERGY STATUS TO OTHER DRUGS, MEDICAMENTS AND BIOLOGICAL SUBSTANCES STATUS: ICD-10-CM
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
## 2022-08-21 DIAGNOSIS — Z79.899 OTHER LONG TERM (CURRENT) DRUG THERAPY: ICD-10-CM

## 2022-08-21 DIAGNOSIS — Z83.79 FAMILY HISTORY OF OTHER DISEASES OF THE DIGESTIVE SYSTEM: ICD-10-CM
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
## 2022-08-21 DIAGNOSIS — Z83.3 FAMILY HISTORY OF DIABETES MELLITUS: ICD-10-CM

## 2022-08-21 DIAGNOSIS — I73.00 RAYNAUD'S SYNDROME WITHOUT GANGRENE: ICD-10-CM

## 2022-08-21 DIAGNOSIS — J44.9 CHRONIC OBSTRUCTIVE PULMONARY DISEASE, UNSPECIFIED: ICD-10-CM
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
## 2022-08-21 DIAGNOSIS — Z98.890 OTHER SPECIFIED POSTPROCEDURAL STATES: ICD-10-CM

## 2022-08-21 DIAGNOSIS — Z80.3 FAMILY HISTORY OF MALIGNANT NEOPLASM OF BREAST: ICD-10-CM

## 2022-08-21 DIAGNOSIS — F32.A DEPRESSION, UNSPECIFIED: ICD-10-CM

## 2022-08-21 DIAGNOSIS — Z88.0 ALLERGY STATUS TO PENICILLIN: ICD-10-CM

## 2022-08-21 DIAGNOSIS — M70.62 TROCHANTERIC BURSITIS, LEFT HIP: ICD-10-CM

## 2022-08-21 DIAGNOSIS — S81.852D OPEN BITE, LEFT LOWER LEG, SUBSEQUENT ENCOUNTER: ICD-10-CM

## 2022-08-21 DIAGNOSIS — M70.61 TROCHANTERIC BURSITIS, RIGHT HIP: ICD-10-CM

## 2022-08-21 DIAGNOSIS — Z80.0 FAMILY HISTORY OF MALIGNANT NEOPLASM OF DIGESTIVE ORGANS: ICD-10-CM

## 2022-08-21 DIAGNOSIS — M32.9 SYSTEMIC LUPUS ERYTHEMATOSUS, UNSPECIFIED: ICD-10-CM

## 2022-08-21 DIAGNOSIS — M85.80 OTHER SPECIFIED DISORDERS OF BONE DENSITY AND STRUCTURE, UNSPECIFIED SITE: ICD-10-CM

## 2022-08-21 DIAGNOSIS — Z80.52 FAMILY HISTORY OF MALIGNANT NEOPLASM OF BLADDER: ICD-10-CM

## 2022-09-05 ENCOUNTER — NON-APPOINTMENT (OUTPATIENT)
Age: 57
End: 2022-09-05

## 2022-09-06 ENCOUNTER — APPOINTMENT (OUTPATIENT)
Dept: WOUND CARE | Facility: HOSPITAL | Age: 57
End: 2022-09-06

## 2022-09-06 ENCOUNTER — OUTPATIENT (OUTPATIENT)
Dept: OUTPATIENT SERVICES | Facility: HOSPITAL | Age: 57
LOS: 1 days | Discharge: HOME | End: 2022-09-06
Payer: COMMERCIAL

## 2022-09-06 VITALS
OXYGEN SATURATION: 96 % | DIASTOLIC BLOOD PRESSURE: 85 MMHG | TEMPERATURE: 97.8 F | BODY MASS INDEX: 28 KG/M2 | HEIGHT: 63 IN | RESPIRATION RATE: 20 BRPM | WEIGHT: 158 LBS | SYSTOLIC BLOOD PRESSURE: 142 MMHG | HEART RATE: 73 BPM

## 2022-09-06 DIAGNOSIS — S81.852D OPEN BITE, LEFT LOWER LEG, SUBSEQUENT ENCOUNTER: ICD-10-CM

## 2022-09-06 DIAGNOSIS — L97.201 NON-PRESSURE CHRONIC ULCER OF UNSPECIFIED CALF LIMITED TO BREAKDOWN OF SKIN: ICD-10-CM

## 2022-09-06 DIAGNOSIS — J34.2 DEVIATED NASAL SEPTUM: Chronic | ICD-10-CM

## 2022-09-06 PROCEDURE — G0463: CPT

## 2022-09-06 PROCEDURE — 99213 OFFICE O/P EST LOW 20 MIN: CPT

## 2022-09-06 NOTE — REVIEW OF SYSTEMS
[Skin Wound] : skin wound [FreeTextEntry5] : PVD s/p right lower leg bypass graft, raynaud [FreeTextEntry6] : Asthma, COPD [de-identified] : left lower leg wound down to skin, subcutaneous tissue, fat, epithelialized [de-identified] : Cervical radiculopathy [de-identified] : Depression [de-identified] : lupus, sjogren's

## 2022-09-06 NOTE — ASSESSMENT
[Verbal] : Verbal [Demo] : Demo [Patient] : Patient [Spouse] : Spouse [Good - alert, interested, motivated] : Good - alert, interested, motivated [Verbalizes knowledge/Understanding] : Verbalizes knowledge/understanding [Dressing changes] : dressing changes [Skin Care] : skin care [Pressure relief] : pressure relief [Signs and symptoms of infection] : sign and symptoms of infection [How and When to Call] : how and when to call [Off-loading] : off-loading [Discharge Planning] : discharge planning [Patient responsibility to plan of care] : patient responsibility to plan of care [] : Yes [Stable] : stable [Home] : Home [Ambulatory] : Ambulatory [Nutrition] : nutrition [Smoking Cessation] : smoking cessation [FreeTextEntry2] : Maintain optimal skin integrity, discharge planning\par  [FreeTextEntry4] : Wound closed\par Patient discharged

## 2022-09-06 NOTE — PHYSICAL EXAM
[4 x 4] : 4 x 4  [2+] : left 2+ [Skin Ulcer] : ulcer [de-identified] : A&Ox3, NAD [de-identified] : 5/5 strength in all quadrants bilaterally [de-identified] : left lower leg wound down to skin, subcutaneous tissue, fat, epithelialized [de-identified] : light touch sensation intact bilaterally [de-identified] : DPM applied dressing [FreeTextEntry1] : Left posterior leg - dry eschar - closed [de-identified] : NSC\par dry protective dressing / cloth tape\par  [de-identified] : \par  [de-identified] : None [de-identified] : None [de-identified] : Yes

## 2022-09-06 NOTE — PLAN
[FreeTextEntry1] : Patient happy with outcome of treatment.\par Patient to be discharged at this time.\par All questions answered to satisfaction and patient verbalized understanding.\par Spent 20 minutes for patient care and medical decision making.\par

## 2022-09-06 NOTE — HISTORY OF PRESENT ILLNESS
[FreeTextEntry1] : Pt seen for follow up of left lower leg wound. pt relates that she has been dressing the wound as advised. Denies any other complaints at this time.

## 2022-09-07 ENCOUNTER — LABORATORY RESULT (OUTPATIENT)
Age: 57
End: 2022-09-07

## 2022-09-07 ENCOUNTER — APPOINTMENT (OUTPATIENT)
Dept: RHEUMATOLOGY | Facility: CLINIC | Age: 57
End: 2022-09-07

## 2022-09-07 DIAGNOSIS — I73.00 RAYNAUD'S SYNDROME WITHOUT GANGRENE: ICD-10-CM

## 2022-09-07 DIAGNOSIS — Z88.0 ALLERGY STATUS TO PENICILLIN: ICD-10-CM

## 2022-09-07 DIAGNOSIS — Z98.890 OTHER SPECIFIED POSTPROCEDURAL STATES: ICD-10-CM

## 2022-09-07 DIAGNOSIS — M85.80 OTHER SPECIFIED DISORDERS OF BONE DENSITY AND STRUCTURE, UNSPECIFIED SITE: ICD-10-CM
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
## 2022-09-07 DIAGNOSIS — J44.9 CHRONIC OBSTRUCTIVE PULMONARY DISEASE, UNSPECIFIED: ICD-10-CM
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
## 2022-09-07 DIAGNOSIS — Z80.0 FAMILY HISTORY OF MALIGNANT NEOPLASM OF DIGESTIVE ORGANS: ICD-10-CM

## 2022-09-07 DIAGNOSIS — M70.62 TROCHANTERIC BURSITIS, LEFT HIP: ICD-10-CM

## 2022-09-07 DIAGNOSIS — Z79.899 OTHER LONG TERM (CURRENT) DRUG THERAPY: ICD-10-CM

## 2022-09-07 DIAGNOSIS — Z83.79 FAMILY HISTORY OF OTHER DISEASES OF THE DIGESTIVE SYSTEM: ICD-10-CM

## 2022-09-07 DIAGNOSIS — Z88.8 ALLERGY STATUS TO OTHER DRUGS, MEDICAMENTS AND BIOLOGICAL SUBSTANCES STATUS: ICD-10-CM

## 2022-09-07 DIAGNOSIS — Z80.52 FAMILY HISTORY OF MALIGNANT NEOPLASM OF BLADDER: ICD-10-CM

## 2022-09-07 DIAGNOSIS — F32.A DEPRESSION, UNSPECIFIED: ICD-10-CM

## 2022-09-07 DIAGNOSIS — S81.852D OPEN BITE, LEFT LOWER LEG, SUBSEQUENT ENCOUNTER: ICD-10-CM

## 2022-09-07 DIAGNOSIS — M32.9 SYSTEMIC LUPUS ERYTHEMATOSUS, UNSPECIFIED: ICD-10-CM
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
## 2022-09-07 DIAGNOSIS — Z83.3 FAMILY HISTORY OF DIABETES MELLITUS: ICD-10-CM

## 2022-09-07 DIAGNOSIS — Z80.3 FAMILY HISTORY OF MALIGNANT NEOPLASM OF BREAST: ICD-10-CM

## 2022-09-07 DIAGNOSIS — M70.61 TROCHANTERIC BURSITIS, RIGHT HIP: ICD-10-CM

## 2022-09-07 PROCEDURE — 99214 OFFICE O/P EST MOD 30 MIN: CPT | Mod: 95

## 2022-09-07 RX ORDER — PREDNISONE 1 MG/1
1 TABLET ORAL
Qty: 360 | Refills: 1 | Status: ACTIVE | COMMUNITY
Start: 2022-03-22 | End: 1900-01-01

## 2022-09-07 NOTE — REVIEW OF SYSTEMS
[Feeling Poorly] : not feeling poorly [Feeling Tired] : not feeling tired [Eye Pain] : no eye pain [Red Eyes] : red eyes [Dry Eyes] : dryness of the eyes [Hoarseness] : hoarseness [Wheezing] : wheezing [Cough] : cough [SOB on Exertion] : no shortness of breath during exertion [Heartburn] : no heartburn [Arthralgias] : arthralgias [Joint Pain] : joint pain [Skin Lesions] : skin lesion [Sleep Disturbances] : sleep disturbances [Depression] : depression [As Noted in HPI] : as noted in HPI [Negative] : Heme/Lymph [FreeTextEntry2] : fatigue- chronic  [FreeTextEntry3] : stable- no visual disturbance [FreeTextEntry6] : mild intermittent [FreeTextEntry7] : nausea w/ MMF better now off  [FreeTextEntry9] :  hand pain and L arm, L lateral hip greatly improved now; back pain greatly improved [de-identified] : see HPI- facial rash, subacute cutaneous lesions on underside of R arm- better now.. Hand lesions considerably better with topical steroids. - and wound from dog bite 7/10/22 still present but greatly improved, now w/ nearly full healing by secondary intention [de-identified] :  trouble sleeping without stable dose trazodone- better now on higher dose ... tapered down venlaxifine by 50% and doing well

## 2022-09-07 NOTE — HISTORY OF PRESENT ILLNESS
[FreeTextEntry1] : Verbal consent given on 09/07/2022 at 19:12 by EMILY CHACON, [].\par [place]\par \par - updated path eval Dr Teran suggests SLE etiology NOT DM... has been off MMF since last visit 8/4/22 (largely stopped 2/2 dog bite wound that was NOT healing).  Now with considerable improvement and nearly fully closed. \par - continues 4 mg pred and overall stable, actually not feeling worse. Skin better with topical steroids, didn’t start HCQ as requested, still wants to minimize medications. \par - UPdated PFTs pending, still smoking.. will continue to f/u Dr. Villalta\par - joint pain / swelling controlled \par \par  \par \par 1) SLE/ DM sine myositis: high titer MELLY, dsDNA+ (intermittently) , + RNP (repeat neg), SSA+ > 8  \par \par 2) Depression/ 27 ys Clean and Sober: \par \par 3) Health mnt:\par Smoking- active > 1 PPD x 30+ ys \par HLD:  persistently elevated \par tchol 274\par Trigy  209\par HDL  60\par [id number]\par last year and again 4/17 was supposed to start statin, but did not.   [Home] : at home, [unfilled] , at the time of the visit. [Medical Office: ([facility])___] : at the medical office located in  [Verbal consent obtained from patient] : the patient, [unfilled]

## 2022-09-07 NOTE — PHYSICAL EXAM
[General Appearance - Alert] : alert [General Appearance - In No Acute Distress] : in no acute distress [General Appearance - Well Nourished] : well nourished [General Appearance - Well Developed] : well developed [General Appearance - Well-Appearing] : healthy appearing [] : the neck was supple [Arterial Pulses Carotid] : carotid pulses were normal with no bruits [Full Pulse] : the pedal pulses are present [Edema] : there was no peripheral edema [No CVA Tenderness] : no ~M costovertebral angle tenderness [No Spinal Tenderness] : no spinal tenderness [Nail Clubbing] : no clubbing  or cyanosis of the fingernails [Motor Tone] : muscle strength and tone were normal [Motor Exam] : the motor exam was normal [No Focal Deficits] : no focal deficits [Oriented To Time, Place, And Person] : oriented to person, place, and time [Impaired Insight] : insight and judgment were intact [Affect] : the affect was normal [FreeTextEntry1] :  calm, appropriate

## 2022-09-08 ENCOUNTER — NON-APPOINTMENT (OUTPATIENT)
Age: 57
End: 2022-09-08

## 2022-12-02 ENCOUNTER — APPOINTMENT (OUTPATIENT)
Dept: INTERNAL MEDICINE | Facility: CLINIC | Age: 57
End: 2022-12-02

## 2022-12-02 ENCOUNTER — NON-APPOINTMENT (OUTPATIENT)
Age: 57
End: 2022-12-02

## 2022-12-02 VITALS
HEART RATE: 101 BPM | BODY MASS INDEX: 27.82 KG/M2 | OXYGEN SATURATION: 96 % | HEIGHT: 63 IN | SYSTOLIC BLOOD PRESSURE: 130 MMHG | TEMPERATURE: 99.7 F | DIASTOLIC BLOOD PRESSURE: 82 MMHG | WEIGHT: 157 LBS

## 2022-12-02 DIAGNOSIS — R91.8 OTHER NONSPECIFIC ABNORMAL FINDING OF LUNG FIELD: ICD-10-CM

## 2022-12-02 DIAGNOSIS — R06.09 OTHER FORMS OF DYSPNEA: ICD-10-CM

## 2022-12-02 DIAGNOSIS — R05.9 COUGH, UNSPECIFIED: ICD-10-CM

## 2022-12-02 PROCEDURE — 99214 OFFICE O/P EST MOD 30 MIN: CPT | Mod: 25

## 2022-12-02 PROCEDURE — 94060 EVALUATION OF WHEEZING: CPT

## 2022-12-02 NOTE — PROCEDURE
[FreeTextEntry1] : Spirometry prepostbronchodilator therapy was performed.\par FEV1 2.44 L which is 96% predicted.  FVC 3.21 L which is 99% predicted.  FEV1/FVC ratio 76%.  PEF is 4.28 L/s which is 68% predicted.  FEF 25/75% is 2.15 L/s which is 89% predicted.  There is no significant bronchodilator response.

## 2022-12-02 NOTE — PHYSICAL EXAM
[No Acute Distress] : no acute distress [Normal Oropharynx] : normal oropharynx [Normal Appearance] : normal appearance [No Neck Mass] : no neck mass [Normal Rate/Rhythm] : normal rate/rhythm [Normal S1, S2] : normal s1, s2 [No Murmurs] : no murmurs [No Resp Distress] : no resp distress [No Abnormalities] : no abnormalities [Benign] : benign [Normal Gait] : normal gait [No Clubbing] : no clubbing [No Cyanosis] : no cyanosis [No Edema] : no edema [FROM] : FROM [Normal Color/ Pigmentation] : normal color/ pigmentation [No Focal Deficits] : no focal deficits [Oriented x3] : oriented x3 [Normal Affect] : normal affect [TextBox_68] : Bilateral expiratory rhonchi

## 2022-12-02 NOTE — DISCUSSION/SUMMARY
[FreeTextEntry1] : Ms. Owen presents for follow-up evaluation.  She is accompanied by her wife.\par Patient has a persistent cough for the past 2 months.  Physical examination demonstrates bilateral expiratory rhonchi.\par Patient does have a history of mild COPD on CT scan and continues to smoke 1 pack of cigarettes per day.\par She will be given a trial of Advair 115/21 mcg 2 puffs twice daily.  Patient will continue use albuterol on a as needed basis.  She is on chronic steroid therapy (prednisone 4 mg daily) for treatment of SLE.\par Follow-up in 6 months.\par Patient will now have yearly low-dose screening CT scan of the chest for lung cancer surveillance.

## 2022-12-02 NOTE — HISTORY OF PRESENT ILLNESS
[TextBox_4] : Patient presents for a follow-up evaluation accompanied by her wife.\par She is complaining of a persistent cough for the past 2 months.  The cough is spasmatic in nature.  He is usually nonproductive but occasionally will have clear phlegm.  There is no associated fevers or chills.  Ms. Spann went to an urgent center and was put on a 10-day course of antibiotics.  There is been no significant improvement in the cough on antibiotic therapy.  She had a chest x-ray which was negative.  No pneumonia was noted.  Patient previously had a CT scan of the chest on 9/12/2022 which showed stable pulmonary nodules.  She has a history of systemic lupus erythematosus.  Ms. Spann denies any significant shortness of breath.  She continues to smoke 1 pack of cigarettes per day.

## 2022-12-05 ENCOUNTER — APPOINTMENT (OUTPATIENT)
Dept: PULMONOLOGY | Facility: CLINIC | Age: 57
End: 2022-12-05

## 2022-12-08 ENCOUNTER — APPOINTMENT (OUTPATIENT)
Dept: RHEUMATOLOGY | Facility: CLINIC | Age: 57
End: 2022-12-08

## 2022-12-08 ENCOUNTER — LABORATORY RESULT (OUTPATIENT)
Age: 57
End: 2022-12-08

## 2022-12-08 VITALS
OXYGEN SATURATION: 92 % | TEMPERATURE: 98 F | DIASTOLIC BLOOD PRESSURE: 60 MMHG | SYSTOLIC BLOOD PRESSURE: 130 MMHG | HEIGHT: 63 IN | HEART RATE: 68 BPM | BODY MASS INDEX: 28.35 KG/M2 | WEIGHT: 160 LBS

## 2022-12-08 DIAGNOSIS — E04.9 NONTOXIC GOITER, UNSPECIFIED: ICD-10-CM

## 2022-12-08 PROCEDURE — 99213 OFFICE O/P EST LOW 20 MIN: CPT

## 2022-12-08 RX ORDER — FLUTICASONE PROPIONATE AND SALMETEROL XINAFOATE 115; 21 UG/1; UG/1
115-21 AEROSOL, METERED RESPIRATORY (INHALATION)
Qty: 1 | Refills: 5 | Status: DISCONTINUED | COMMUNITY
Start: 2022-12-02 | End: 2022-12-08

## 2022-12-08 NOTE — ASSESSMENT
[FreeTextEntry1] : 58 yo wf with longstanding SLE/ DM sine myositis, HLD, Depression, recovering ETOH and recently dx w/ intraductal papilloma R breast s/p lumpectomy- no additional tx needed \par - bx confirmed SLE / not DM recently 9/22 \par \par \par 1) SLE/ DM sine myositis: high titer MELLY, + SSA > 8,  dsDNA+ (intermittently) , + RNP in past, nothing + in past few ys, Bx + for SLE- ? discoid and/ or subacute cutaneous several new lesions.  2 different bx c/w SLE.. less likely DM.. recommend following tx for SLE to treat.  Since then stopped MMF and restarted HCQ at 400 mg (72 kg= 5.5 mg/kg) and skin markedly better than it has been for years.  GI tolerance fine!).  \par Continues 4 mg prednisone daily.. wounds finally healed and recent cough as noted was NOT infectious.  No infections since last visit.  \par Labs from 6/22 suggest active systemic inflammation with ESR 60 and CRP 7.-> update needed now   \par Given topicals and again emphasized need to avoid sun exposure (has never been good at this) ... Now working w/ Dr. Teran. \par Updated CTscan stable pulm nodules with no evidence of ILD (9/22) , spirometry stable (no PFTs since 2019).. , still smoking but continues to try to lower dose \par No myositis now or in past\par Updated PFTs still needed, CT chest and Echo 2019 -> 9/22 stable- improved,\par NOTE: \par - elixer MMF not better tolerated, nausea at times considerable \par - Failed to control w/ HCQ alone on low dose steroids x many ys, added Benlysta, tapered off steroids but after 1 yr, refused to continue- was not necessarily better. \par - 2014 seen by Say Phillips  for derm eval dx facial rash as DM but no bx.  \par - skin bx ys ago Dr. Perea + SLE no details available many ys ago \par \par 2) Depression/ 30+ ys Clean and Sober:  intermittently struggles w/ feelings of depression but no suicidal ideations-better past yr and continues 75 mg venlaxifine and doing fairly well , continues to have strong family support\par Fatigue chronically, worse when SLE/ DM not fully controlled but may also have FLOR (recommend pulm eval but not done despite repeated recommendations.  Should do full sleep study! and doesn't feel this is necessary. \par \par 3) CV risk: high given longstanding heavy smoker, chronic inflammatory state (SLE/DM) and long term use Steroids w/ NEEDS PCP repeatedly advised and not done\par - PreDM w/ HbA1c 5.9.. \par -  HLD:  persistently elevated, , TG  209, HDL  60, [id number]- updated orders now repeat.. \par last year and again 4/17 was supposed to start statin, but did not. .\par Noted plaque formation on carotid studies, echo suggested ischemic change but stress test neg.  Cath was not done and continues to deny palpitations, cp, sob, rosas... despite extensive review subtle sx in females.. still denies.\par - Smoking: Each visit discussed long standing smoking and has no interest in stopping still   \par Still needs PCP.. \par \par 4) Non compliance:  repeatedly as noted over years... f/u infrequent ov, failed to secure PCP- still - studies not done as requested. \par  Remain concerned about multiple risk factors for CVD, emphasized this today and every visit.. in past also discussed w/  pt and partner... but over time little changes.  \par Admits depression is a factor.. but doesn't feel this is completely the reason and denies depression at this time.  \par \par 5) Osteopenia: last study 3/23/22 w/ most severe T score -1.1 at spine with nl density at hips and low risk for fracture 16% overall and 1.5% risk hip fracture despite recent fragility (no trauma) fx of L1 (recognize that DJD spine alters reading at this level- and spine is NOT part of Frax calculation).  \par No  previous tx. Lengthy discussion today, would recommend BP tx.. literature provided will start next visit \par Risk factors: long term steroid use, smoking\par Need to start tx, recommend risedronate.. advised few oral doses and if tolerated Reclast for optimal compliance \par \par 6) Cervical radiculopathy/ myofascial pain: doing well lately, much better with less stress \par  xrays 2/20 w/ cervical straightening  of cervical spine and if not better w/ steroids/ muscle relaxants will get MRI- all sx better with better\par \par 7) Dog bite: since 7/10/22, not healing well still deep wound with local redness despite doxy.. refer to wound care center ? concerned about use of MMF- will switch not to HCQ and mnt pred 4 mg (no higher) - finally much improved.. since stopping MMF \par  \par Vaccination: \par - annual flu vaccine annually \par - Pneumonia 23 4 ys ago\par - Prevnar 13 needed\par - Sars-Cov-2 Vaccine completed..excellent antibody response\par \par \par \par Plan:\par \par - take Actonel (risedronate) tab once month:\par First thing in am\par Full glass water, \par Don't lie down/ or bend over for 30-45 mins... \par Please pay attention to any abd pain, heart burn... or bony pain - usually worse over lower back - throughout pelvic area\par - anything more than 2-3 days is abnormal, let us know... \par \par - would follow up with reclast if tolerated- once year infusion, very easy.  REcommended because of your long term steroids and vertebral fracture\par \par - continue  mg daily, but you MUST get eye exam within next 6 m \par \par - Call if you get COVID and we will also consider giving you anti viral therapy \par \par - Prevnar 13 next visit!!!  or consider Prevar 20 (still not interested) \par \par - need thyroid US next yr f/u with ENT 3/23 \par \par - recommend continued work from home, disease activity  better controlled then ever!  On lowest dose steroids noted and tolerated in past 10 ys \par \par - rto 4 m \par \par - as always, you should consider decreasing cigarette smoking\par \par - continue to lower prednisone no faster than 1 mg every 2 weeks .. but likely need to add another DMARD.. updated derm eval to confirm:  suggests actually ScLE  . \par \par - ok to continue with 200 mg trazodone\par \par - Make appt with pulm for:  Sleep study  (not necessary at this point).\par \par - \par \par

## 2022-12-08 NOTE — REVIEW OF SYSTEMS
[Red Eyes] : red eyes [Dry Eyes] : dryness of the eyes [Hoarseness] : hoarseness [Wheezing] : wheezing [Cough] : cough [Arthralgias] : arthralgias [Joint Pain] : joint pain [Skin Lesions] : skin lesion [Sleep Disturbances] : sleep disturbances [Depression] : depression [As Noted in HPI] : as noted in HPI [Negative] : Heme/Lymph [Feeling Poorly] : not feeling poorly [Feeling Tired] : not feeling tired [Eye Pain] : no eye pain [SOB on Exertion] : no shortness of breath during exertion [Heartburn] : no heartburn [FreeTextEntry2] : fatigue- chronic  [FreeTextEntry3] : stable- no visual disturbance [FreeTextEntry4] : stable  [FreeTextEntry6] : 2 m dry non productive cough, finally better past few days  [FreeTextEntry7] : improved nausea since stopping MMF  [FreeTextEntry9] :  hand pain and L arm, L lateral hip greatly improved now; back pain greatly improved [de-identified] : see HPI- facial rash, subacute cutaneous lesions on underside of R arm- and hands/ forearms all considerably better.  hyperpigmentation at site of wound from dog bite 7/10/22- but fully healed  [de-identified] :  trouble sleeping without stable dose trazodone- better now on higher dose ... tapered down venlaxifine by 50% and doing well

## 2022-12-08 NOTE — HISTORY OF PRESENT ILLNESS
[FreeTextEntry1] : 12/8/22\par - frustrated by persistent cough dry cough extensive impact on sleep till recently.. now finally starting \par - skin stable at this point, nothing on hands .. updated eval.. and taking 4 mg prednisone routinely.. occas arthralgias severe .. but tolerable on current therapy\par - absolutely notes improvement in skin back on HCQ.. not using Plaquenil.. \par - seen by Pawan... doesn't feel routine meds necessary, updated spirometry still excellent even this week when dry cough persisted x several weeks. \par - No active swelling in any joints, minimal mucositis, no sicca. RP present but mild.. \par - still smoking < full pack per day\par - mood stable .. actually off venlfaxine \par \par  \par \par 1) SLE/ DM sine myositis: high titer MELLY, dsDNA+ (intermittently) , + RNP (repeat neg), SSA+ > 8  \par \par 2) Depression/ 27 ys Clean and Sober: \par \par 3) Health mnt:\par Smoking- active > 1 PPD x 30+ ys \par HLD:  persistently elevated \par tchol 274\par Trigy  209\par HDL  60\par [id number]\par last year and again 4/17 was supposed to start statin, but did not.

## 2022-12-13 LAB
ALBUMIN SERPL ELPH-MCNC: 4.9 G/DL
ALP BLD-CCNC: 81 U/L
ALT SERPL-CCNC: 15 U/L
ANION GAP SERPL CALC-SCNC: 11 MMOL/L
APPEARANCE: CLEAR
AST SERPL-CCNC: 19 U/L
BASOPHILS # BLD AUTO: 0.07 K/UL
BASOPHILS NFR BLD AUTO: 0.8 %
BILIRUB SERPL-MCNC: 0.2 MG/DL
BILIRUBIN URINE: NEGATIVE
BLOOD URINE: NEGATIVE
BUN SERPL-MCNC: 13 MG/DL
C3 SERPL-MCNC: 135 MG/DL
C4 SERPL-MCNC: 25 MG/DL
CALCIUM SERPL-MCNC: 10.3 MG/DL
CHLORIDE SERPL-SCNC: 98 MMOL/L
CK SERPL-CCNC: 69 U/L
CO2 SERPL-SCNC: 28 MMOL/L
COLOR: YELLOW
CREAT SERPL-MCNC: 0.69 MG/DL
CRP SERPL-MCNC: 4 MG/L
DSDNA AB SER-ACNC: 15 IU/ML
EGFR: 101 ML/MIN/1.73M2
EOSINOPHIL # BLD AUTO: 0.17 K/UL
EOSINOPHIL NFR BLD AUTO: 2 %
ERYTHROCYTE [SEDIMENTATION RATE] IN BLOOD BY WESTERGREN METHOD: 8 MM/HR
GLUCOSE QUALITATIVE U: NEGATIVE
GLUCOSE SERPL-MCNC: 79 MG/DL
HCT VFR BLD CALC: 47.9 %
HGB BLD-MCNC: 15.4 G/DL
IMM GRANULOCYTES NFR BLD AUTO: 0.5 %
KETONES URINE: NEGATIVE
LEUKOCYTE ESTERASE URINE: ABNORMAL
LYMPHOCYTES # BLD AUTO: 1.24 K/UL
LYMPHOCYTES NFR BLD AUTO: 14.9 %
MAN DIFF?: NORMAL
MCHC RBC-ENTMCNC: 28.6 PG
MCHC RBC-ENTMCNC: 32.2 GM/DL
MCV RBC AUTO: 88.9 FL
MONOCYTES # BLD AUTO: 0.37 K/UL
MONOCYTES NFR BLD AUTO: 4.4 %
NEUTROPHILS # BLD AUTO: 6.43 K/UL
NEUTROPHILS NFR BLD AUTO: 77.4 %
NITRITE URINE: NEGATIVE
PH URINE: 6.5
PLATELET # BLD AUTO: 255 K/UL
POTASSIUM SERPL-SCNC: 4.6 MMOL/L
PROT SERPL-MCNC: 7.5 G/DL
PROTEIN URINE: NORMAL
RBC # BLD: 5.39 M/UL
RBC # FLD: 13.4 %
SODIUM SERPL-SCNC: 137 MMOL/L
SPECIFIC GRAVITY URINE: 1.01
UROBILINOGEN URINE: NORMAL
WBC # FLD AUTO: 8.32 K/UL

## 2023-02-20 ENCOUNTER — NON-APPOINTMENT (OUTPATIENT)
Age: 58
End: 2023-02-20

## 2023-02-21 ENCOUNTER — NON-APPOINTMENT (OUTPATIENT)
Age: 58
End: 2023-02-21

## 2023-02-22 ENCOUNTER — TRANSCRIPTION ENCOUNTER (OUTPATIENT)
Age: 58
End: 2023-02-22

## 2023-03-01 ENCOUNTER — APPOINTMENT (OUTPATIENT)
Dept: RHEUMATOLOGY | Facility: CLINIC | Age: 58
End: 2023-03-01
Payer: COMMERCIAL

## 2023-03-01 ENCOUNTER — LABORATORY RESULT (OUTPATIENT)
Age: 58
End: 2023-03-01

## 2023-03-01 VITALS
OXYGEN SATURATION: 98 % | TEMPERATURE: 97.9 F | SYSTOLIC BLOOD PRESSURE: 128 MMHG | BODY MASS INDEX: 28.7 KG/M2 | DIASTOLIC BLOOD PRESSURE: 82 MMHG | WEIGHT: 162 LBS | HEART RATE: 88 BPM

## 2023-03-01 PROCEDURE — 99213 OFFICE O/P EST LOW 20 MIN: CPT | Mod: 25

## 2023-03-01 PROCEDURE — 20610 DRAIN/INJ JOINT/BURSA W/O US: CPT | Mod: RT

## 2023-03-01 RX ORDER — METHYLPRED ACET/NACL,ISO-OS/PF 80 MG/ML
80 VIAL (ML) INJECTION
Qty: 1 | Refills: 0 | Status: COMPLETED | OUTPATIENT
Start: 2023-03-01

## 2023-03-01 RX ADMIN — METHYLPREDNISOLONE ACETATE 0 MG/ML: 80 INJECTION, SUSPENSION INTRA-ARTICULAR; INTRALESIONAL; INTRAMUSCULAR; SOFT TISSUE at 00:00

## 2023-03-04 LAB
APPEARANCE: CLEAR
BILIRUBIN URINE: NEGATIVE
BLOOD URINE: NEGATIVE
COLOR: YELLOW
CREAT SPEC-SCNC: 34 MG/DL
CREAT/PROT UR: 0.3 RATIO
GLUCOSE QUALITATIVE U: NEGATIVE
KETONES URINE: NEGATIVE
LEUKOCYTE ESTERASE URINE: ABNORMAL
NITRITE URINE: NEGATIVE
PH URINE: 7
PROT UR-MCNC: 10 MG/DL
PROTEIN URINE: NORMAL
SPECIFIC GRAVITY URINE: 1.01
UROBILINOGEN URINE: NORMAL

## 2023-03-04 NOTE — PROCEDURE
[Today's Date:] : Date: [unfilled] [Risks] : risks [Benefits] : benefits [Alternatives] : alternatives [Consent Obtained] : written consent was obtained prior to the procedure and is detailed in the patient's record [Patient] : Prior to the start of the procedure a time out was taken and the identity of the patient was confirmed via name and date of birth with the patient. The correct site and the procedure to be performed were confirmed. The correct side was confirmed if applicable. The availability of the correct equipment was verified [Therapeutic] : therapeutic [#1 Site: ______] : #1 site identified in the [unfilled] [Ethyl Chloride] : ethyl chloride [___ ml Inj] : [unfilled] ~Uml [2%] : 2% [Betadine] : betadine solution [Alcohol] : alcohol [25 gauge 1 inch] : A 25 gauge 1 inch needle was used [Depomedrol ___ mg] : Depomedrol [unfilled] mg [Tolerated Well] : the patient tolerated the procedure well [No Complications] : there were no complications [Instructions Given] : handouts/patient instructions were given to patient [Patient Instructed to Call] : patient was instructed to call if redness at site, a decrease in range of motion or an increase in pain is noted after procedure. [Arthrocentesis] : arthrocentesis was performed [de-identified] : (0.5 used)- given 40 mg  [FreeTextEntry1] : Ice routinely 20/20 for the next 24 hrs and bianka if pain not improved w/in 72 hr or if worsened joint pain, or signs of infection including fever, chills, redness at site ensues. This is a strong steroid. It can cause anxiety, hunger, irritability, trouble sleeping, rarely causes palpitations or chest pain but if present go to to ER and then let me know.\par \par

## 2023-03-04 NOTE — HISTORY OF PRESENT ILLNESS
[FreeTextEntry1] : 3/1/2023\par \par - recent cardiac issues\par - had two stents placed?????\par - I HONESTLY WAS NOT LISTENING TO YOU ALL'S CONVERSATION\par \par \par \par \par 12/8/22\par - frustrated by persistent cough dry cough extensive impact on sleep till recently.. now finally starting \par - skin stable at this point, nothing on hands .. updated eval.. and taking 4 mg prednisone routinely.. occas arthralgias severe .. but tolerable on current therapy\par - absolutely notes improvement in skin back on HCQ.. not using Plaquenil.. \par - seen by Shawnmi... doesn't feel routine meds necessary, updated spirometry still excellent even this week when dry cough persisted x several weeks. \par - No active swelling in any joints, minimal mucositis, no sicca. RP present but mild.. \par - still smoking < full pack per day\par - mood stable .. actually off venlfaxine \par \par  \par \par 1) SLE/ DM sine myositis: high titer MELLY, dsDNA+ (intermittently) , + RNP (repeat neg), SSA+ > 8  \par \par 2) Depression/ 27 ys Clean and Sober: \par \par 3) Health mnt:\par Smoking- active > 1 PPD x 30+ ys \par HLD:  persistently elevated \par tchol 274\par Trigy  209\par HDL  60\par [id number]\par last year and again 4/17 was supposed to start statin, but did not.

## 2023-03-04 NOTE — REVIEW OF SYSTEMS
[Red Eyes] : red eyes [Dry Eyes] : dryness of the eyes [Hoarseness] : hoarseness [Wheezing] : wheezing [Cough] : cough [Arthralgias] : arthralgias [Joint Pain] : joint pain [Skin Lesions] : skin lesion [Sleep Disturbances] : sleep disturbances [Depression] : depression [As Noted in HPI] : as noted in HPI [Negative] : Heme/Lymph [Feeling Poorly] : not feeling poorly [Feeling Tired] : not feeling tired [Eye Pain] : no eye pain [SOB on Exertion] : no shortness of breath during exertion [Heartburn] : no heartburn [FreeTextEntry2] : fatigue- chronic  [FreeTextEntry3] : stable- no visual disturbance [FreeTextEntry4] : stable  [FreeTextEntry6] : 2 m dry non productive cough, finally better past few days  [FreeTextEntry7] : improved nausea since stopping MMF  [FreeTextEntry9] :  hand pain and L arm, L lateral hip greatly improved now; back pain greatly improved [de-identified] : see HPI- facial rash, subacute cutaneous lesions on underside of R arm- and hands/ forearms all considerably better.  hyperpigmentation at site of wound from dog bite 7/10/22- but fully healed  [de-identified] :  trouble sleeping without stable dose trazodone- better now on higher dose ... tapered down venlaxifine by 50% and doing well

## 2023-03-04 NOTE — PHYSICAL EXAM
----- Message from Carl Rodriguez sent at 3/20/2018  6:43 PM CDT -----  Pt relief is needed from the dated procedure 3/16.    Thanks   [General Appearance - Alert] : alert [General Appearance - In No Acute Distress] : in no acute distress [General Appearance - Well Nourished] : well nourished [General Appearance - Well Developed] : well developed [General Appearance - Well-Appearing] : healthy appearing [Outer Ear] : the ears and nose were normal in appearance [] : no respiratory distress [Auscultation Breath Sounds / Voice Sounds] : lungs were clear to auscultation bilaterally [Heart Rate And Rhythm] : heart rate was normal and rhythm regular [Heart Sounds] : normal S1 and S2 [Heart Sounds Gallop] : no gallops [Murmurs] : no murmurs [Heart Sounds Pericardial Friction Rub] : no pericardial rub [Arterial Pulses Carotid] : carotid pulses were normal with no bruits [Full Pulse] : the pedal pulses are present [Edema] : there was no peripheral edema [Cervical Lymph Nodes Enlarged Posterior Bilaterally] : posterior cervical [Cervical Lymph Nodes Enlarged Anterior Bilaterally] : anterior cervical [Supraclavicular Lymph Nodes Enlarged Bilaterally] : supraclavicular [Axillary Lymph Nodes Enlarged Bilaterally] : axillary [No CVA Tenderness] : no ~M costovertebral angle tenderness [No Spinal Tenderness] : no spinal tenderness [Abnormal Walk] : normal gait [Nail Clubbing] : no clubbing  or cyanosis of the fingernails [Motor Tone] : muscle strength and tone were normal [Motor Exam] : the motor exam was normal [No Focal Deficits] : no focal deficits [Oriented To Time, Place, And Person] : oriented to person, place, and time [Impaired Insight] : insight and judgment were intact [Affect] : the affect was normal [FreeTextEntry1] :  calm, appropriate

## 2023-03-04 NOTE — ASSESSMENT
[FreeTextEntry1] : 59 yo wf with longstanding SLE/ DM sine myositis, HLD, Depression, recovering ETOH and recently dx w/ intraductal papilloma R breast s/p lumpectomy- no additional tx needed \par - bx confirmed SLE / not DM recently 9/22 \par - STENT x 2 2/23 presented with CP/ MI... no other complications \par \par HPI \par As noted recent CP and extensive cardiac w/u + for CAD required 2 STENTs.. doing well now but has been told again to taper off steroids, now at 2 mg and tolerated fairly well. Continues to c/o fatigue but has been c/o for many ys, not necessarily worse. Only new sx is \par R lateral hip pain > L severe, struggling to sleep on side.. \par \par PE:\par Updated labs stable CBC, CMP at hosp 2/23 w/ excellent Lipid levels.. , LD 56,  but HD only 44\par CV:  RRR no MRG, no peripheral swelling \par Pulm:  CTAP \par MSK:  BL hips severe TTT over GTB R > L \par \par \par 1) SLE/ DM sine myositis: high titer MELLY, + SSA > 8,  dsDNA+ (intermittently) , + RNP in past, nothing + in past few ys, Bx + for SLE- ? discoid and/ or subacute cutaneous several new lesions.  2 different bx c/w SLE.. less likely DM.. recommend following tx for SLE to treat.  Since then stopped MMF and restarted HCQ at 400 mg (72 kg= 5.5 mg/kg) and skin markedly better than it has been for years.  GI tolerance fine!).  \par Working on tapering pred, now on 2 mg daily... all wounds resolved and overall not considerably worse 4 mg prednisone daily.. wounds finally healed and recent cough as noted was NOT infectious.  No infections since last visit.  \par Labs from 6/22 suggest active systemic inflammation with ESR 60 and CRP 7.-> update 1/22 nl.     \par Given topicals and again emphasized need to avoid sun exposure (has never been good at this) ... Now working w/ Dr. Teran. \par Updated CTscan stable pulm nodules with no evidence of ILD (9/22) , spirometry stable (no PFTs since 2019).. , still smoking but continues to try to lower dose \par No myositis now or in past\par Updated PFTs still needed, CT chest and Echo 2019 -> 9/22 stable- improved,\par NOTE: \par - elixer MMF not better tolerated, nausea at times considerable \par - Failed to control w/ HCQ alone on low dose steroids x many ys, added Benlysta, tapered off steroids but after 1 yr, refused to continue- was not necessarily better. \par - 2014 seen by Say Phillips  for derm eval dx facial rash as DM but no bx.  \par - skin bx ys ago Dr. Perea + SLE no details available many ys ago \par \par 2) Depression/ 30+ ys Clean and Sober:  intermittently struggles w/ feelings of depression but no suicidal ideations-better past yr and continues 75 mg venlaxifine and doing fairly well , continues to have strong family support\par Fatigue chronically, worse when SLE/ DM not fully controlled but may also have FLOR (recommend pulm eval but not done despite repeated recommendations.  Should do full sleep study! and doesn't feel this is necessary. \par \par 3) CV risk: high given longstanding heavy smoker, chronic inflammatory state (SLE/DM) and long term use Steroids w/ NEEDS PCP repeatedly advised and not done.. now presents after acute cardiac event 2/14/23 s/p STENTs \par - PreDM w/ HbA1c 5.9.. \par -  HLD:  persistently elevated, , TG  209, HDL  60, [id number]- update as noted.. greatly improved 2/23 \par last year and again 4/17 was supposed to start statin, but did not. .\par Noted plaque formation on carotid studies, echo suggested ischemic change but stress test neg.  Cath was not done and continues to deny palpitations, cp, sob, rosas... despite extensive review subtle sx in females.. still denies.\par - Smoking: Each visit discussed long standing smoking and has no interest in stopping still   \par Still needs PCP.. \par \par 4) Non compliance:  repeatedly as noted over years... f/u infrequent ov, failed to secure PCP- still - studies not done as requested. \par  Remain concerned about multiple risk factors for CVD, emphasized this today and every visit.. in past also discussed w/  pt and partner... but over time little changes.  \par Admits depression is a factor.. but doesn't feel this is completely the reason and denies depression at this time.  \par \par 5) Osteopenia: last study 3/23/22 w/ most severe T score -1.1 at spine with nl density at hips and low risk for fracture 16% overall and 1.5% risk hip fracture despite recent fragility (no trauma) fx of L1 (recognize that DJD spine alters reading at this level- and spine is NOT part of Frax calculation).  \par No  previous tx. Lengthy discussion today, would recommend BP tx.. literature provided will start next visit \par Risk factors: long term steroid use, smoking\par Need to start tx, recommend risedronate.. advised few oral doses and if tolerated Reclast for optimal compliance \par \par 6) Cervical radiculopathy/ myofascial pain: doing well lately, much better with less stress \par  xrays 2/20 w/ cervical straightening  of cervical spine and if not better w/ steroids/ muscle relaxants will get MRI- all sx better with better\par \par 7) GTB severe pain bl given 40 mg depo today R side only \par  \par Vaccination: \par - annual flu vaccine annually \par - Pneumonia 23 4 ys ago\par - Prevnar 13 needed\par - Sars-Cov-2 Vaccine completed..excellent antibody response\par \par \par \par Plan:\par \par -continue to  take Actonel (risedronate) tab once month:\par First thing in am\par Full glass water, \par Don't lie down/ or bend over for 30-45 mins... \par Please pay attention to any abd pain, heart burn... or bony pain - usually worse over lower back - throughout pelvic area\par - anything more than 2-3 days is abnormal, let us know... \par \par - would follow up with reclast if tolerated- once year infusion, very easy.  REcommended because of your long term steroids and vertebral fracture\par \par - continue  mg daily, but you MUST get eye exam within next 6 m \par \par - Call if you get COVID and we will also consider giving you anti viral therapy \par \par - Prevnar 13 next visit!!!  or consider Prevar 20 (still not interested) \par \par - need thyroid US next yr f/u with ENT 3/23 \par \par - recommend continued work from home, disease activity  better controlled then ever!  On lowest dose steroids noted and tolerated in past 10 ys \par \par - GTB injection R side 40 mg depo, call if want L side \par \par - rto 4 m \par \par - as always, you should consider decreasing cigarette smoking\par \par - continue to lower prednisone no faster than 1 mg every 2 weeks .. but likely need to add another DMARD.. updated derm eval to confirm:  suggests actually ScLE  . \par \par - ok to continue with 200 mg trazodone\par \par - Make appt with pulm for:  Sleep study  (not necessary at this point).\par \par - \par \par

## 2023-03-10 ENCOUNTER — APPOINTMENT (OUTPATIENT)
Dept: PHYSICAL MEDICINE AND REHAB | Facility: CLINIC | Age: 58
End: 2023-03-10
Payer: COMMERCIAL

## 2023-03-10 VITALS
WEIGHT: 160 LBS | BODY MASS INDEX: 28.35 KG/M2 | HEART RATE: 71 BPM | RESPIRATION RATE: 14 BRPM | DIASTOLIC BLOOD PRESSURE: 84 MMHG | SYSTOLIC BLOOD PRESSURE: 134 MMHG | HEIGHT: 63 IN

## 2023-03-10 DIAGNOSIS — Z78.9 OTHER SPECIFIED HEALTH STATUS: ICD-10-CM

## 2023-03-10 DIAGNOSIS — M16.9 OSTEOARTHRITIS OF HIP, UNSPECIFIED: ICD-10-CM

## 2023-03-10 PROCEDURE — 99203 OFFICE O/P NEW LOW 30 MIN: CPT | Mod: GC

## 2023-03-10 NOTE — HISTORY OF PRESENT ILLNESS
[FreeTextEntry1] : Ms. EMILY CHACON is a 58 year old female with a PMHx of SLE/DM, Depression, CAD s/p stent who presents with hip pain.  2 weeks ago underwent CSI for GTB with minimal relief.  Currently on Plavix and ASA.   \par \par Location:R hip\par Onset: Chronic, no inciting event\par Provocation/Palliative: worse with going upstairs>>downstairs; prolong sitting\par Quality: sharp\par Radiation: right groin, nothing down legs\par Severity: 8/10\par Timing: with movement \par \par Denies any associated numbness. Denies any associated leg weakness. Denies any loss of bowel/bladder control or any groin numbness.\par Previous medications trialed:On chronic Prednisone slowly tapering now at 2mg daily\par Previous procedures relevant to complaint: CSI GTB without relief\par Conservative therapy tried?: No recent PT\par

## 2023-03-10 NOTE — ASSESSMENT
[FreeTextEntry1] : Ms. EMILY CHACON is a 58 year old female with a PMHx of SLE/DM, Depression, CAD s/p stent who presents with hip pain. Pain likely due to OA. Will recommend:\par -XR of R hip and if pain doesn't improve by follow-up consider MRI\par -Start PT 2-3x/week for stretching, strengthening (especially of core muscles), ROM exercises, HEP and modalities PRN including myofascial release, moist heat \par -Patient to continue steroid taper was per rheumatology\par \par RTC in 1 month. All questions answered.

## 2023-03-10 NOTE — PHYSICAL EXAM
[FreeTextEntry1] : PE:\par Constitutional: In NAD, calm and cooperative\par MSK (Hips)\par 	Inspection: no gross swelling identified\par 	Palpation: Mild tenderness over the greater trochanteric bursa on the right\par 	ROM: Pain at end ER/IR of R hip\par 	Strength: 5/5 strength in bilateral lower extremities\par 	Reflexes: 2+ Patella reflex bilaterally, 1+ Achilles reflex bilaterally, negative clonus bilaterally\par 	Sensation: Intact to light touch in bilateral lower extremities\par Special tests:\par SLR: negative bilaterally\par ROXY: + for anterior hip pain on right\par FADIR: + on right\par \par

## 2023-03-14 ENCOUNTER — NON-APPOINTMENT (OUTPATIENT)
Age: 58
End: 2023-03-14

## 2023-03-14 DIAGNOSIS — M89.8X9 OTHER SPECIFIED DISORDERS OF BONE, UNSPECIFIED SITE: ICD-10-CM

## 2023-04-11 ENCOUNTER — APPOINTMENT (OUTPATIENT)
Dept: ORTHOPEDIC SURGERY | Facility: CLINIC | Age: 58
End: 2023-04-11
Payer: COMMERCIAL

## 2023-04-11 VITALS
HEART RATE: 68 BPM | BODY MASS INDEX: 28.35 KG/M2 | DIASTOLIC BLOOD PRESSURE: 80 MMHG | HEIGHT: 63 IN | WEIGHT: 160 LBS | SYSTOLIC BLOOD PRESSURE: 154 MMHG

## 2023-04-11 PROCEDURE — 99203 OFFICE O/P NEW LOW 30 MIN: CPT

## 2023-04-11 NOTE — HISTORY OF PRESENT ILLNESS
[de-identified] : 4/11/2023: Yas is a pleasant 58-year-old right-hand-dominant female who presents to the office today for evaluation of right hip pain.  The patient has a history of lupus and states that she has had longstanding pain in her right hip for many years.  However, it has acutely gotten worse since January 2023.  She has been seeing Dr. Whitlock.  She has been taking prednisone for her lupus which she recently finished.  Physical therapy was recommended but she never went.  X-rays were obtained which demonstrated possible heterotopic ossification and a subsequent MRI was obtained before being referred to me for specialist opinion.  The patient states that her rheumatologist did attempt a hip injection a couple of months ago but it was a low-dose of cortisone and did not provide her with much relief.  The patient denies any fevers, chills, sweats, recent illnesses, numbness, tingling, weakness, or pain elsewhere at this time.

## 2023-04-11 NOTE — PHYSICAL EXAM
[de-identified] : General:\par Awake, alert, no acute distress, Patient was cooperative and appropriate during the examination.\par \par The patient's weight is of normal weight for height and age.\par \par Walks with without an antalgic gait.\par \par Full, painless range of motion of the neck and back.\par \par Exam of the bilateral lower extremities is intact and symmetric with regards to dermatologic, vascular, and neurologic exam. Bilateral lower extremity sensation is grossly intact to light touch in the DP/SP/T/S/S nerve distributions. Intact DF/PF/EHL. BIlateral lower extremity warm and well-perfused with brisk capillary refill.\par \par Pulmonary:\par Regular, nonlabored breathing\par \par Abdomen:\par Soft, nontender, nondistended.\par \par Lymphatic:\par No evidence of inguinal lymphadenopathy\par \par \par \par Right hip Examination:\par Physical examination of the hip demonstrates normal skin without signs of skin changes or abnormalities. No erythema, warmth, or joint effusion is appreciated.\par \par Sensation is intact to light touch L2-S1\par Palpable DP/PT pulse\par EHL/FHL/TA/GSC motor function intact\par \par Range of Motion\par 120 degrees of flexion to full extension\par 45 degrees of internal rotation\par 60 degrees of external rotation\par 45 degrees of hip abduction\par 20 degrees of hip adduction\par \par Strength Testing\par Hip Flexors/Hip Extensors 5/5\par Hip Abductors/Hip Adductors 5/5\par Quadriceps/Hamstring 5/5\par Patient is able to perform a straight leg raise without difficulty.\par \par Palpation\par Exquisitely tender to palpation about the greater trochanter\par Not tender to palpation about the hip joint\par Not tender to palpation about the lumbar spinous processes\par Not tender to palpation about the iliac crests or sacrum\par \par Special Tests\par ROXY test negative\par FADIR test positive for pain at the greater trochanter\par Mary test positive for pain at the greater trochanter\par Straight leg raise test negative\par \par  [de-identified] : MRI of the right hip from  radiology on 3/30/2023 was reviewed the patient today in the office.  The patient has moderate gluteus minimus and medius tendinosis without tears.  Degenerative disc disease at the lumbosacral junction.  Heterotopic ossification identified on the x-rays previously has not identified on MRI.\par \par X-rays of the right hip from  radiology were also reviewed.  Minimal calcifications about the greater trochanter at the hip abductor insertion are noted with possible atherosclerosis about the femoral artery.

## 2023-04-11 NOTE — DISCUSSION/SUMMARY
[de-identified] : Assessment: 58-year-old female with right hip pain secondary to trochanteric bursitis and hip abductor tendinopathy\par \par Plan: I had a long discussion with the patient today regarding the nature of their diagnosis and treatment plan. We discussed the risks and benefits of no treatment as well as nonoperative and operative treatments.  I reviewed the patient's imaging today with her in the office which demonstrates tendinopathy of the gluteus medius and minimus without tearing.  At this time I recommend conservative treatment of the patient's condition with modalities including rest, ice, heat, anti-inflammatory medications, activity modifications, and home stretching and strengthening exercises daily.  A prescription for meloxicam was sent to her pharmacy today.  I discussed with the patient the risks and benefits associated with NSAID use.  A referral for physical therapy was provided to begin working on exercises for her hip to help improve her pain and function.  Recommend follow-up in 8 weeks for repeat assessment.  If the patient continues to have pain at that time we will consider a cortisone injection.\par \par The patient verbalizes their understanding and agrees with the plan.  All questions were answered to their satisfaction.

## 2023-04-13 ENCOUNTER — NON-APPOINTMENT (OUTPATIENT)
Age: 58
End: 2023-04-13

## 2023-04-19 ENCOUNTER — APPOINTMENT (OUTPATIENT)
Dept: ORTHOPEDIC SURGERY | Facility: CLINIC | Age: 58
End: 2023-04-19

## 2023-04-21 ENCOUNTER — APPOINTMENT (OUTPATIENT)
Dept: PHYSICAL MEDICINE AND REHAB | Facility: CLINIC | Age: 58
End: 2023-04-21

## 2023-06-06 ENCOUNTER — APPOINTMENT (OUTPATIENT)
Dept: INTERNAL MEDICINE | Facility: CLINIC | Age: 58
End: 2023-06-06

## 2023-06-07 ENCOUNTER — APPOINTMENT (OUTPATIENT)
Dept: ORTHOPEDIC SURGERY | Facility: CLINIC | Age: 58
End: 2023-06-07
Payer: COMMERCIAL

## 2023-06-07 VITALS
HEART RATE: 71 BPM | BODY MASS INDEX: 28.35 KG/M2 | WEIGHT: 160 LBS | SYSTOLIC BLOOD PRESSURE: 120 MMHG | DIASTOLIC BLOOD PRESSURE: 77 MMHG | HEIGHT: 63 IN

## 2023-06-07 DIAGNOSIS — M70.61 TROCHANTERIC BURSITIS, RIGHT HIP: ICD-10-CM

## 2023-06-07 PROCEDURE — 99213 OFFICE O/P EST LOW 20 MIN: CPT

## 2023-06-07 NOTE — PHYSICAL EXAM
[de-identified] : General:\par Awake, alert, no acute distress, Patient was cooperative and appropriate during the examination.\par \par The patient's weight is of normal weight for height and age.\par \par Walks with without an antalgic gait.\par \par Full, painless range of motion of the neck and back.\par \par Exam of the bilateral lower extremities is intact and symmetric with regards to dermatologic, vascular, and neurologic exam. Bilateral lower extremity sensation is grossly intact to light touch in the DP/SP/T/S/S nerve distributions. Intact DF/PF/EHL. BIlateral lower extremity warm and well-perfused with brisk capillary refill.\par \par Pulmonary:\par Regular, nonlabored breathing\par \par Abdomen:\par Soft, nontender, nondistended.\par \par Lymphatic:\par No evidence of inguinal lymphadenopathy\par \par Right hip Examination:\par Physical examination of the hip demonstrates normal skin without signs of skin changes or abnormalities. No erythema, warmth, or joint effusion is appreciated.\par \par Sensation is intact to light touch L2-S1\par Palpable DP/PT pulse\par EHL/FHL/TA/GSC motor function intact\par \par Range of Motion\par 120 degrees of flexion to full extension\par 45 degrees of internal rotation\par 60 degrees of external rotation\par 45 degrees of hip abduction\par 20 degrees of hip adduction\par \par Strength Testing\par Hip Flexors/Hip Extensors 5/5\par Hip Abductors/Hip Adductors 5/5\par Quadriceps/Hamstring 5/5\par Patient is able to perform a straight leg raise without difficulty.\par \par Palpation\par Exquisitely tender to palpation about the greater trochanter\par Not tender to palpation about the hip joint\par Not tender to palpation about the lumbar spinous processes\par Not tender to palpation about the iliac crests or sacrum\par \par Special Tests\par ROXY test negative\par FADIR test positive for pain at the greater trochanter\par Mary test positive for pain at the greater trochanter\par Straight leg raise test negative\par \par  [de-identified] : MRI of the right hip from  radiology on 3/30/2023 was reviewed the patient today in the office.  The patient has moderate gluteus minimus and medius tendinosis without tears.  Degenerative disc disease at the lumbosacral junction.  Heterotopic ossification identified on the x-rays previously has not identified on MRI.\par \par X-rays of the right hip from  radiology were also reviewed.  Minimal calcifications about the greater trochanter at the hip abductor insertion are noted with possible atherosclerosis about the femoral artery.

## 2023-06-07 NOTE — DISCUSSION/SUMMARY
[de-identified] : Assessment: 58-year-old female with right hip pain secondary to trochanteric bursitis and hip abductor tendinopathy\par \par Plan: I had a long discussion with the patient today regarding the nature of their diagnosis and treatment plan. We discussed the risks and benefits of no treatment as well as nonoperative and operative treatments.  I reviewed the patient's imaging today with her in the office which demonstrates tendinopathy of the gluteus medius and minimus without tearing.  At this time I recommend continued conservative treatment of the patient's condition with modalities including rest, ice, heat, anti-inflammatory medications, activity modifications, and home stretching and strengthening exercises daily.  A new prescription for meloxicam was sent to her pharmacy today.  I discussed with the patient the risks and benefits associated with NSAID use.  A referral for physical therapy was provided to begin working on exercises for her hip to help improve her pain and function.  She was offered a cortisone injection today but declined.  Recommend follow-up in 8 weeks for repeat assessment, sooner if she would like to consider cortisone injection sooner.  If the patient continues to have pain at that time we will consider a cortisone injection.  Patient discussed and reviewed with Dr. Magana today.\par \par The patient verbalizes their understanding and agrees with the plan.  All questions were answered to their satisfaction.

## 2023-09-05 ENCOUNTER — APPOINTMENT (OUTPATIENT)
Dept: RHEUMATOLOGY | Facility: CLINIC | Age: 58
End: 2023-09-05
Payer: COMMERCIAL

## 2023-09-05 ENCOUNTER — LABORATORY RESULT (OUTPATIENT)
Age: 58
End: 2023-09-05

## 2023-09-05 VITALS
HEART RATE: 75 BPM | WEIGHT: 158 LBS | BODY MASS INDEX: 28 KG/M2 | TEMPERATURE: 97.2 F | SYSTOLIC BLOOD PRESSURE: 120 MMHG | OXYGEN SATURATION: 94 % | HEIGHT: 63 IN | DIASTOLIC BLOOD PRESSURE: 70 MMHG

## 2023-09-05 PROCEDURE — 99215 OFFICE O/P EST HI 40 MIN: CPT

## 2023-09-05 RX ORDER — ROSUVASTATIN CALCIUM 10 MG/1
10 TABLET, FILM COATED ORAL
Refills: 0 | Status: ACTIVE | COMMUNITY

## 2023-09-05 RX ORDER — CLOPIDOGREL 75 MG/1
75 TABLET, FILM COATED ORAL
Refills: 0 | Status: ACTIVE | COMMUNITY

## 2023-09-05 RX ORDER — ASPIRIN 81 MG/1
81 TABLET, COATED ORAL
Refills: 0 | Status: ACTIVE | COMMUNITY

## 2023-09-05 NOTE — DATA REVIEWED
[FreeTextEntry1] : Labs:  1/30 neg Jo1, nl CK, ESR 44  8/19 nl CBC, CMP, ESr, CK, CRP, SPEP, C3/4, TSH and neg dsDNA, vit D 67  Imaging:   3/23 MR R hip moderate gluteus minimus and medius tendinosis w/out tears, DJD LS but JS well preserved at hip, nl SI joints, no evidence of AVN, no effusion  can chest   9/22 stable mild emphysema and stable peripheral interstitial markings - no new or suspicious findings.   Echo 10/19 nl w/ RVSP 20 and nl otherwise  CTScan chest 10/19  stable overall mild emphysema and 0.4 cm nodule with mild evidence of early fibrotic changes suggestive  - updated echo 5/18 mild PAs 36 - updated CTscan chest 4/18 mild emphysema and 0.4 cm nodule- both stable with no evidence of ILD - updated PFTs w/ DLCO excellent  - dx w/ sclerosed intraductal papiloma presented w/ R nipple discharge 1 yr ago... lumpectomy few days ago, thus far doing well, awaiting path.  HRCT 7/17/17 emphysema predominantly in apical/ paraseptal... mild peripheral pulm fibrosis w/ out honeycombing.. stable from 2016 study CTScan 5/21/16 stable emphysema w/ progressed ILD CTscan 9/14 stable emphysema w/ subtle fibrotic changes- stable, should be repeated Echo 3/13:  fine, repeat 3/15 stable PFTs 7/17 "aced" despite continued smoking 1PPD- 2PPD (not available for review)  Brain MRI for severe transient HA neg 3/16

## 2023-09-05 NOTE — REVIEW OF SYSTEMS
[Red Eyes] : red eyes [Dry Eyes] : dryness of the eyes [Hoarseness] : hoarseness [Wheezing] : wheezing [Cough] : cough [Arthralgias] : arthralgias [Joint Pain] : joint pain [Skin Lesions] : skin lesion [Sleep Disturbances] : sleep disturbances [Depression] : depression [As Noted in HPI] : as noted in HPI [Negative] : Heme/Lymph [Feeling Poorly] : not feeling poorly [Feeling Tired] : not feeling tired [Eye Pain] : no eye pain [SOB on Exertion] : no shortness of breath during exertion [Heartburn] : no heartburn [FreeTextEntry2] : fatigue- chronic slightly worse lately  [FreeTextEntry3] : stable- no visual disturbance [FreeTextEntry4] : stable  [FreeTextEntry6] : 2 m+ dry non productive cough- wheezing and now occas CLOUD  [FreeTextEntry7] : improved nausea since stopping MMF  [FreeTextEntry9] :  hand pain and L arm, L lateral hip greatly improved now; back pain greatly improved [de-identified] :  facial rash, subacute cutaneous lesions on underside of R arm- and hands/ forearms all considerably better.  hyperpigmentation at site of wound from dog bite 7/10/22- but fully healed  [de-identified] :  trouble sleeping without stable dose trazodone- better now on higher dose ... tapered down venlaxifine by 50% and doing well

## 2023-09-05 NOTE — HISTORY OF PRESENT ILLNESS
[FreeTextEntry1] : 9/5/2023 - Persistent cough, congestion, mild CLOUD intermittently for past several months, worse over past few weeks.  In/ out urgent care w/ little improvement- on / off steroids and antibiotics (but can't remember which)... has not had any imaging.  Scheduled to see pulm 9/18/23.  Denies wt loss, fevers, malaise- though chronic fatigue (actually slightly better with steroids) - Wheezing persists and worse w/ activity  - had tapered prednisone off over several months.. was off for 2 m .. diffuse joint pain "everywhere"..  greatly nearly fully improved- back on prednisone.   - did not start Risedronate..  - Still smoking, though does have quit date 10/17/23  - following closely with cardiology q 3 m following STENTs.   - 3/23 MR R hip moderate gluteus minimus and medius tendinosis w/out tears, DJD LS but JS well preserved at hip, nl SI joints, no evidence of AVN, no effusion - severe back pain at site vertebral fracture much improved     1) SLE/ DM sine myositis: high titer MELLY, dsDNA+ (intermittently) , + RNP (repeat neg), SSA+ > 8    2) Depression/ 27 ys Clean and Sober:   3) Health mnt: Smoking- active > 1 PPD x 30+ ys  HLD:  persistently elevated  tchol 274 Trigy  209 HDL  60 [id number] last year and again 4/17 was supposed to start statin, but did not.

## 2023-09-05 NOTE — ASSESSMENT
[FreeTextEntry1] : 59 yo wf with longstanding SLE/ DM sine myositis, HLD/ CAD s/p STENTS, Depression, recovering ETOH and recently dx w/ intraductal papilloma R breast s/p lumpectomy- no additional tx needed  - bx confirmed SLE / not DM recently 9/22  -    1) SLE/ DM sine myositis: high titer MELLY, + SSA > 8,  dsDNA+ (intermittently) , + RNP in past, nothing + in past few ys, Bx + for SLE- ? discoid and/ or subacute cutaneous several new lesions.  2 different bx c/w SLE.. less likely DM.. recommend following tx for SLE to treat.  Since then stopped MMF and restarted HCQ at 400 mg (72 kg= 5.5 mg/kg) and skin markedly better than it has been for years.  GI tolerance much improved.. but joint pain, diffuse arthralgias/ myalgias  and rash all only fairly controlled over summer months and tapering off steroids.  With exacerbation of pulm issues lately.. restarted prednisone 20-40 mg with marked improvement in pain - though not necessarily any improvement rash. Currently at 20 mg pred ... for lung issues and still wheezing. .Most recent CTscan chest   9/22 stable mild emphysema and stable peripheral interstitial markings - no new or suspicious findings.     Labs from 6/22 suggest active systemic inflammation with ESR 60 and CRP 7.-> update 12/22 nl, repeat needed now    Given topicals and again emphasized need to avoid sun exposure (has never been good at this) ... Now working w/ Dr. Teran.  Updated CTscan stable pulm nodules with no evidence of ILD (9/22) , spirometry stable (no PFTs since 2019).. , still smoking but continues to try to lower dose  No myositis now or in past Updated PFTs still needed, CT chest and Echo 2019 -> 9/22 stable- improved, NOTE:  - elixer MMF not better tolerated, nausea at times considerable on any dose  - Failed to control w/ HCQ alone on low dose steroids x many ys, added Benlysta, tapered off steroids but after 1 yr, refused to continue-  - 2014 seen by [facility]  for derm eval dx facial rash as DM but no bx.   - skin bx ys ago Dr. Perea + SLE no details available many ys ago   2) Depression/ 30+ ys Clean and Sober:  intermittently struggles w/ feelings of depression but no suicidal ideations-better past yr and continues 75 mg venlaxifine and doing fairly well , continues to have strong family support Fatigue chronically, worse when SLE/ DM not fully controlled but may also have FLOR (recommend pulm eval but not done despite repeated recommendations.  Should do full sleep study! and doesn't feel this is necessary.   3) CV risk: high given longstanding heavy smoker, chronic inflammatory state (SLE/DM) and long term use Steroids w/ NEEDS PCP repeatedly advised still struggling to find someone convenient  - PreDM w/ HbA1c 5.9..  - CAD:  s/p Stents 2022 following q 3 m w/ cardiology on Plavix and ASA 81 mg  -  HLD:  persistently elevated, , TG  209, HDL  60, [id number]- now on Rosuvustatin 10 mg  last year and again 4/17 was supposed to start statin, but did not. . Noted plaque formation on carotid studies, echo suggested ischemic change but stress test neg.  Cath was not done and continues to deny palpitations, cp, sob, rosas... despite extensive review subtle sx in females.. still denies. - Smoking: Each visit discussed long standing smoking and has no interest in stopping still    Still needs PCP..   4) Non compliance:  repeatedly as noted over years... f/u infrequent ov, failed to secure PCP- still - studies not done as requested.   Remain concerned about multiple risk factors for CVD, emphasized this today and every visit.. in past also discussed w/  pt and partner... but over time little changes.   Admits depression is a factor.. but doesn't feel this is completely the reason and denies depression at this time.    5) Osteopenia: last study 3/23/22 w/ most severe T score -1.1 at spine with nl density at hips and low risk for fracture 16% overall and 1.5% risk hip fracture despite recent fragility (no trauma) fx of L1 (recognize that DJD spine alters reading at this level- and spine is NOT part of Frax calculation).   No  previous tx. Lengthy discussion today, would recommend BP tx.. literature provided advised to start but not done.. given degree of pulm distress will not start now .. but once pulm issues stabilized needs to start..  Risk factors: long term steroid use, smoking Need to start tx, recommend risedronate.. advised few oral doses and if tolerated Reclast for optimal compliance   6) Cervical radiculopathy/ myofascial pain: doing well lately, much better with less stress   xrays 2/20 w/ cervical straightening  of cervical spine and if not better w/ steroids/ muscle relaxants will get MRI- all sx better with better   Vaccination:  - annual flu vaccine annually needed (high dose)  - Pneumonia 23 4 ys ago - Prevnar 13 needed-> would suggest Prevnar 20 now  - Sars-Cov-2 Vaccine completed..excellent antibody response    Plan: - first address pulm issues, acute.. need updated CTScan and call for results.. to see if you need an antibiotic.  Previous tx w/   - once lungs are stable we need to treat the bones:  Take Actonel (risedronate) tab once month: First thing in am Full glass water,  Don't lie down/ or bend over for 30-45 mins...  Please pay attention to any abd pain, heart burn... or bony pain - usually worse over lower back - throughout pelvic area - anything more than 2-3 days is abnormal, let us know...   - would follow up with reclast (zoledronic acid) if tolerated- once year infusion, very easy.  REcommended because of your long term steroids and vertebral fracture  - continue  mg daily, but you MUST get eye exam within next 6 m !!! OUCH!!! PLEASE   - Call if you get COVID and we will also consider giving you anti viral therapy   - Prevnar 13 next visit!!!  or consider Prevar 20 (still not interested)   - recommend continued work from home, disease activity  better controlled then ever!  On lowest dose steroids noted and tolerated in past 10 ys   - Quit date 10/17/23  - as always, you should consider decreasing cigarette smoking.  Talk to Dr Burleson about this   - continue to lower prednisone no faster than 1 mg every 2 weeks .. but likely need to add another DMARD.. updated derm eval to confirm:  suggests actually ScLE.. may need to consider Saphnelo..   .   - ok to continue with 200 mg trazodone   9/18/23  with pulm for Dr Gonzalez:  Sleep study should be considered in future because of your chronic fatigue  - labs today ..  .  - need to understand STENTs better and name of cardiology (when, how many- was there an MI)  -

## 2023-09-12 LAB
ALBUMIN SERPL ELPH-MCNC: 5.1 G/DL
ALP BLD-CCNC: 82 U/L
ALT SERPL-CCNC: 22 U/L
ANION GAP SERPL CALC-SCNC: 17 MMOL/L
ANTI-BETA2 GLYCOPROTEIN 1 IGG CONCENTRATION: 2 U/ML
ANTI-BETA2 GLYCOPROTEIN 1 IGM CONCENTRATION: 3 U/ML
ANTI-CARDIOLIPIN IGG CONCENTRATION: 2 GPL
ANTI-CARDIOLIPIN IGM CONCENTRATION: 3 MPL
ANTI-CENP IGG CONCENTRATION: 1 U/ML
ANTI-CYCLIC CITRULLINATED PEPTIDE IGG CONCENTRATION: 2 U/ML
ANTI-DOUBLE-STRANDED DNA IGG CONCENTRATION: 56 IU/ML
ANTI-JO-1 IGG CONCENTRATION: <0.3 U/ML
ANTI-NUCLEAR ANTIBODIES - CYTOPLASMIC PATTERN: NORMAL
ANTI-NUCLEAR ANTIBODIES - PRIMARY NUCLEAR PATTERN: NORMAL
ANTI-NUCLEAR ANTIBODIES - PRIMARY PATTERN TITER: ABNORMAL
ANTI-NUCLEAR ANTIBODIES IGG CONCENTRATION: >150 UNITS
ANTI-RNA POL III IGG CONCENTRATION: <0.7 U/ML
ANTI-RNP70 IGG CONCENTRATION: 1 U/ML
ANTI-RO52 IGG CONCENTRATION: >240 U/ML
ANTI-RO60 IGG CONCENTRATION: 188 U/ML
ANTI-SCL-70 IGG CONCENTRATION: 1 U/ML
ANTI-SMITH IGG CONCENTRATION: 2 U/ML
ANTI-SS-B (LA) IGG CONCENTRATION: <0.4 U/ML
ANTI-THYROGLOBULIN IGG CONCENTRATION: 33 IU/ML
ANTI-THYROID PEROXIDASE IGG CONCENTRATION: <4 IU/ML
ANTI-U1RNP IGG CONCENTRATION: 1 U/ML
APPEARANCE: CLEAR
AST SERPL-CCNC: 28 U/L
AVISE LUPUS INDEX: 0
AVISE LUPUS RESULT: POSITIVE
B-LYMPHOCYTE-BOUND C4D (BC4D) LEVEL: 21
BILIRUB SERPL-MCNC: 0.3 MG/DL
BILIRUBIN URINE: NEGATIVE
BLOOD URINE: ABNORMAL
BUN SERPL-MCNC: 14 MG/DL
CALCIUM SERPL-MCNC: 11.2 MG/DL
CHLORIDE SERPL-SCNC: 99 MMOL/L
CO2 SERPL-SCNC: 24 MMOL/L
COLOR: YELLOW
COVID-19 SPIKE DOMAIN ANTIBODY INTERPRETATION: POSITIVE
CREAT SERPL-MCNC: 0.73 MG/DL
CREAT SPEC-SCNC: 39 MG/DL
CREAT/PROT UR: 0.3 RATIO
CRP SERPL-MCNC: <3 MG/L
DEPRECATED KAPPA LC FREE/LAMBDA SER: 1.14 RATIO
EGFR: 95 ML/MIN/1.73M2
ERYTHROCYTE [SEDIMENTATION RATE] IN BLOOD BY WESTERGREN METHOD: 44 MM/HR
ERYTHROCYTE-BOUND C4D (EC4D) LEVEL: 5
GLUCOSE QUALITATIVE U: NEGATIVE MG/DL
GLUCOSE SERPL-MCNC: 91 MG/DL
HCT VFR BLD CALC: 54.5 %
HGB BLD-MCNC: 16.9 G/DL
HYDROXYCHLOROQUINE CONCENTRATION: 1633 NG/ML
IGA SER QL IEP: 193 MG/DL
IGG SER QL IEP: 837 MG/DL
IGM SER QL IEP: 142 MG/DL
KAPPA LC CSF-MCNC: 1.41 MG/DL
KAPPA LC SERPL-MCNC: 1.61 MG/DL
KETONES URINE: NEGATIVE MG/DL
LEUKOCYTE ESTERASE URINE: ABNORMAL
MCHC RBC-ENTMCNC: 28.7 PG
MCHC RBC-ENTMCNC: 31 GM/DL
MCV RBC AUTO: 92.7 FL
MYELOPEROXIDASE AB SER QL IA: <5 UNITS
MYELOPEROXIDASE CELLS FLD QL: NEGATIVE
NITRITE URINE: NEGATIVE
PH URINE: 6
PLATELET # BLD AUTO: 278 K/UL
POTASSIUM SERPL-SCNC: 4.7 MMOL/L
PROT SERPL-MCNC: 7.8 G/DL
PROT UR-MCNC: 10 MG/DL
PROTEIN URINE: NEGATIVE MG/DL
PROTEINASE3 AB SER IA-ACNC: <5 UNITS
PROTEINASE3 AB SER-ACNC: NEGATIVE
RBC # BLD: 5.88 M/UL
RBC # FLD: 14.6 %
RHEUMATOID FACTOR (IGA) CONCENTRATION: 8 IU/ML
RHEUMATOID FACTOR (IGM) CONCENTRATION: 5 IU/ML
SARS-COV-2 AB SERPL IA-ACNC: >250 U/ML
SODIUM SERPL-SCNC: 140 MMOL/L
SPECIFIC GRAVITY URINE: 1.01
UROBILINOGEN URINE: 0.2 MG/DL
WBC # FLD AUTO: 11.89 K/UL

## 2023-09-13 ENCOUNTER — APPOINTMENT (OUTPATIENT)
Dept: RHEUMATOLOGY | Facility: CLINIC | Age: 58
End: 2023-09-13
Payer: COMMERCIAL

## 2023-09-13 PROCEDURE — 99442: CPT

## 2023-09-18 ENCOUNTER — APPOINTMENT (OUTPATIENT)
Dept: PULMONOLOGY | Facility: CLINIC | Age: 58
End: 2023-09-18
Payer: COMMERCIAL

## 2023-09-18 VITALS
WEIGHT: 160 LBS | SYSTOLIC BLOOD PRESSURE: 126 MMHG | BODY MASS INDEX: 27.31 KG/M2 | OXYGEN SATURATION: 97 % | HEART RATE: 78 BPM | DIASTOLIC BLOOD PRESSURE: 74 MMHG | HEIGHT: 64 IN | RESPIRATION RATE: 16 BRPM

## 2023-09-18 DIAGNOSIS — F17.200 NICOTINE DEPENDENCE, UNSPECIFIED, UNCOMPLICATED: ICD-10-CM

## 2023-09-18 DIAGNOSIS — R91.8 OTHER NONSPECIFIC ABNORMAL FINDING OF LUNG FIELD: ICD-10-CM

## 2023-09-18 PROCEDURE — 99406 BEHAV CHNG SMOKING 3-10 MIN: CPT

## 2023-09-18 PROCEDURE — 99215 OFFICE O/P EST HI 40 MIN: CPT | Mod: 25

## 2023-09-18 RX ORDER — ASPIRIN 81 MG/1
81 TABLET ORAL
Refills: 0 | Status: ACTIVE | COMMUNITY

## 2023-09-18 RX ORDER — TIOTROPIUM BROMIDE INHALATION SPRAY 3.12 UG/1
2.5 SPRAY, METERED RESPIRATORY (INHALATION) DAILY
Qty: 1 | Refills: 5 | Status: ACTIVE | COMMUNITY
Start: 2023-09-18 | End: 1900-01-01

## 2023-12-06 ENCOUNTER — LABORATORY RESULT (OUTPATIENT)
Age: 58
End: 2023-12-06

## 2023-12-06 ENCOUNTER — APPOINTMENT (OUTPATIENT)
Dept: RHEUMATOLOGY | Facility: CLINIC | Age: 58
End: 2023-12-06
Payer: COMMERCIAL

## 2023-12-06 VITALS
OXYGEN SATURATION: 95 % | DIASTOLIC BLOOD PRESSURE: 70 MMHG | HEIGHT: 64 IN | TEMPERATURE: 97.1 F | BODY MASS INDEX: 29.37 KG/M2 | SYSTOLIC BLOOD PRESSURE: 122 MMHG | HEART RATE: 88 BPM | WEIGHT: 172 LBS

## 2023-12-06 DIAGNOSIS — F17.219 NICOTINE DEPENDENCE, CIGARETTES, WITH UNSPECIFIED NICOTINE-INDUCED DISORDERS: ICD-10-CM

## 2023-12-06 DIAGNOSIS — J44.9 CHRONIC OBSTRUCTIVE PULMONARY DISEASE, UNSPECIFIED: ICD-10-CM

## 2023-12-06 DIAGNOSIS — F32.A DEPRESSION, UNSPECIFIED: ICD-10-CM

## 2023-12-06 PROCEDURE — 36415 COLL VENOUS BLD VENIPUNCTURE: CPT

## 2023-12-06 PROCEDURE — 99214 OFFICE O/P EST MOD 30 MIN: CPT | Mod: 25

## 2023-12-06 RX ORDER — MELOXICAM 15 MG/1
15 TABLET ORAL
Qty: 21 | Refills: 0 | Status: DISCONTINUED | COMMUNITY
Start: 2023-04-11 | End: 2023-12-06

## 2023-12-06 RX ORDER — RISEDRONATE SODIUM 150 MG/1
150 TABLET, FILM COATED ORAL
Qty: 3 | Refills: 3 | Status: DISCONTINUED | COMMUNITY
Start: 2022-12-08 | End: 2023-12-06

## 2023-12-20 LAB
ALBUMIN MFR SERPL ELPH: 60.8 %
ALBUMIN SERPL ELPH-MCNC: 4.7 G/DL
ALBUMIN SERPL-MCNC: 4.6 G/DL
ALBUMIN/GLOB SERPL: 1.6 RATIO
ALP BLD-CCNC: 67 U/L
ALPHA1 GLOB MFR SERPL ELPH: 4.2 %
ALPHA1 GLOB SERPL ELPH-MCNC: 0.3 G/DL
ALPHA2 GLOB MFR SERPL ELPH: 10.7 %
ALPHA2 GLOB SERPL ELPH-MCNC: 0.8 G/DL
ALT SERPL-CCNC: 20 U/L
ANION GAP SERPL CALC-SCNC: 13 MMOL/L
APPEARANCE: CLEAR
AST SERPL-CCNC: 22 U/L
B-GLOBULIN MFR SERPL ELPH: 11 %
B-GLOBULIN SERPL ELPH-MCNC: 0.8 G/DL
BILIRUB SERPL-MCNC: 0.4 MG/DL
BILIRUBIN URINE: NEGATIVE
BLOOD URINE: NEGATIVE
BUN SERPL-MCNC: 17 MG/DL
C3 SERPL-MCNC: 152 MG/DL
C4 SERPL-MCNC: 28 MG/DL
CALCIUM SERPL-MCNC: 10.3 MG/DL
CHLORIDE SERPL-SCNC: 100 MMOL/L
CO2 SERPL-SCNC: 27 MMOL/L
COLOR: YELLOW
CREAT SERPL-MCNC: 0.66 MG/DL
CREAT SPEC-SCNC: 55 MG/DL
CREAT/PROT UR: 0.2 RATIO
CRP SERPL-MCNC: <3 MG/L
DSDNA AB SER-ACNC: <12 IU/ML
EGFR: 102 ML/MIN/1.73M2
ERYTHROCYTE [SEDIMENTATION RATE] IN BLOOD BY WESTERGREN METHOD: 12 MM/HR
GAMMA GLOB FLD ELPH-MCNC: 1 G/DL
GAMMA GLOB MFR SERPL ELPH: 13.3 %
GLUCOSE QUALITATIVE U: NEGATIVE MG/DL
GLUCOSE SERPL-MCNC: 92 MG/DL
HCT VFR BLD CALC: 46.8 %
HGB BLD-MCNC: 15.2 G/DL
INTERPRETATION SERPL IEP-IMP: NORMAL
KETONES URINE: NEGATIVE MG/DL
LEUKOCYTE ESTERASE URINE: ABNORMAL
MCHC RBC-ENTMCNC: 28.9 PG
MCHC RBC-ENTMCNC: 32.5 GM/DL
MCV RBC AUTO: 89 FL
NITRITE URINE: NEGATIVE
PH URINE: 6
PLATELET # BLD AUTO: 242 K/UL
POTASSIUM SERPL-SCNC: 4.8 MMOL/L
PROT SERPL-MCNC: 7.5 G/DL
PROT UR-MCNC: 13 MG/DL
PROTEIN URINE: NEGATIVE MG/DL
RBC # BLD: 5.26 M/UL
RBC # FLD: 12.6 %
SODIUM SERPL-SCNC: 140 MMOL/L
SPECIFIC GRAVITY URINE: 1.02
UROBILINOGEN URINE: 0.2 MG/DL
WBC # FLD AUTO: 6.05 K/UL

## 2024-01-16 ENCOUNTER — APPOINTMENT (OUTPATIENT)
Dept: RHEUMATOLOGY | Facility: CLINIC | Age: 59
End: 2024-01-16

## 2024-01-19 ENCOUNTER — APPOINTMENT (OUTPATIENT)
Dept: PULMONOLOGY | Facility: CLINIC | Age: 59
End: 2024-01-19
Payer: COMMERCIAL

## 2024-01-19 VITALS
RESPIRATION RATE: 16 BRPM | DIASTOLIC BLOOD PRESSURE: 78 MMHG | HEART RATE: 113 BPM | OXYGEN SATURATION: 96 % | SYSTOLIC BLOOD PRESSURE: 126 MMHG

## 2024-01-19 VITALS — HEART RATE: 88 BPM

## 2024-01-19 VITALS — BODY MASS INDEX: 30.5 KG/M2 | HEIGHT: 62.5 IN | WEIGHT: 170 LBS

## 2024-01-19 DIAGNOSIS — F17.290 NICOTINE DEPENDENCE, OTHER TOBACCO PRODUCT, UNCOMPLICATED: ICD-10-CM

## 2024-01-19 PROCEDURE — 99213 OFFICE O/P EST LOW 20 MIN: CPT | Mod: 25

## 2024-01-19 PROCEDURE — 94010 BREATHING CAPACITY TEST: CPT

## 2024-01-19 NOTE — PHYSICAL EXAM
[No Acute Distress] : no acute distress [Normal Oropharynx] : normal oropharynx [Normal Appearance] : normal appearance [Normal Rate/Rhythm] : normal rate/rhythm [Normal S1, S2] : normal s1, s2 [No Murmurs] : no murmurs [No Rubs] : no rubs [No Gallops] : no gallops [No Resp Distress] : no resp distress [No Acc Muscle Use] : no acc muscle use [Normal Palpation] : normal palpation [Clear to Auscultation Bilaterally] : clear to auscultation bilaterally [Normal to Percussion] : normal to percussion [No Abnormalities] : no abnormalities [Normal Gait] : normal gait [No Clubbing] : no clubbing [No Cyanosis] : no cyanosis [No Edema] : no edema [FROM] : FROM [Normal Color/ Pigmentation] : normal color/ pigmentation [No Focal Deficits] : no focal deficits [Oriented x3] : oriented x3 [Normal Affect] : normal affect

## 2024-01-19 NOTE — DISCUSSION/SUMMARY
[FreeTextEntry1] : COPD GOLD grade I, complicated by Nicotine addiction and CAD/stents, SLE - myositis on low dose pred Currently at baseline, cigarrete free, but now vaping nicotine CT scan 9/23 reviewed, emphysema, thin walled cysts, airway thickening and RML density noted, will repeat ordered Spirometry remains normal, no sig change 12/22 prn rescue, Spiriva stopped per pt, as she feels doesnt need Smoking cessation, nicotine replacement discussed Repeat CT chest pending Alpha 1, asthma profile 3-months with above or sooner if needed, wiill call with CT scan

## 2024-01-19 NOTE — REASON FOR VISIT
[Cough] : cough [COPD] : COPD [Shortness of Breath] : shortness of breath [Pulmonary Nodules] : pulmonary nodules [Follow-Up] : a follow-up visit

## 2024-01-19 NOTE — REVIEW OF SYSTEMS
[Arthralgias] : arthralgias [Myalgias] : myalgias [Rash] : rash [Negative] : Psychiatric [TextBox_30] : see HPI

## 2024-01-19 NOTE — CONSULT LETTER
[Dear  ___] : Dear  [unfilled], [Consult Letter:] : I had the pleasure of evaluating your patient, [unfilled]. [Please see my note below.] : Please see my note below. [Sincerely,] : Sincerely, [DrAdelina  ___] : Dr. REID [FreeTextEntry3] : Abraham [facility] DO [facility] Director Pulmonary Division Medical Director Respiratory Therapy [facility]

## 2024-01-19 NOTE — HISTORY OF PRESENT ILLNESS
[>= 20 pack years] : >= 20 pack years [Former] : former [TextBox_4] : 30 plus pack yr smoker recently given Advair HFA and albuterol rescue cough has improved, still present Former ETOH, in AA works at home, not very active no fever, chill, chest pain Has 2 stents, Jaw and chest pain, Dr [place]- gonzalo, plaix Has SLE/myositis- plaquenil , low dose prednisone dog at home, not allergic  1/19/23 stopped smoking, but now vaping nicotine stopped wheezing, feels sig better not using Spiriva no CP or SOB prednisone 4 mg for SLE myositis  [YearQuit] : 2023 [TextBox_29] : vaping nicotine

## 2024-02-23 RX ORDER — VENLAFAXINE HYDROCHLORIDE 75 MG/1
75 TABLET, EXTENDED RELEASE ORAL
Qty: 90 | Refills: 1 | Status: DISCONTINUED | COMMUNITY
Start: 2022-12-08 | End: 2024-02-23

## 2024-03-05 ENCOUNTER — APPOINTMENT (OUTPATIENT)
Dept: RHEUMATOLOGY | Facility: CLINIC | Age: 59
End: 2024-03-05

## 2024-04-03 ENCOUNTER — RX RENEWAL (OUTPATIENT)
Age: 59
End: 2024-04-03

## 2024-04-15 ENCOUNTER — TRANSCRIPTION ENCOUNTER (OUTPATIENT)
Age: 59
End: 2024-04-15

## 2024-04-22 ENCOUNTER — APPOINTMENT (OUTPATIENT)
Dept: PULMONOLOGY | Facility: CLINIC | Age: 59
End: 2024-04-22

## 2024-05-07 NOTE — ASSESSMENT
[FreeTextEntry1] : 56 yo wf with longstanding SLE/ DM sine myositis, HLD, Depression, recovering ETOH and recently dx w/ intraductal papilloma R breast s/p lumpectomy- no additional tx needed \par \par \par 1) SLE/ DM sine myositis: high titer MELLY, + SSA > 8,  dsDNA+ (intermittently) , + RNP in past, nothing + in past few ys, Bx + for SLE- ? discoid and/ or subacute cutaneous several new lesions (updated bx 7/22- Jeffry).  2 different bx c/w SLE.. less likely DM.. recommend following tx for SLE to treat.\par Concerned about non healing dog bite and use of MMF - additionally little improvement in skin despite fs... will stop for now and retry HCQ (71 kg= 5.6 mg/kg) if tolerated and disease activity well controlled will lower dose likley to 300 mg.  \par Labs from 6/22 suggest active systemic inflammation with ESR 60 and CRP 7.... updated labs needed and ordered now.  \par Given topicals with + response.. and with sun avoidance is doing well, better this summer than seen in past several years.   \par Updated CTscan pending, spirometry stable, still smoking \par No myositis now or in past\par Echo 2019 -> 3/22 stable- improved,\par NOTE: \par - elixer MMF not better tolerated, nausea at times considerable - minimal improvement at FS x many ys \par - Failed to control w/ HCQ alone on low dose steroids x many ys, added Benlysta, tapered off steroids but after 1 yr, refused to continue- was not necessarily better. \par - 2014 seen by [facility]  for derm eval dx facial rash as DM but no bx.  \par - skin bx ys ago Dr. Perea + SLE no details available many ys ago \par \par 2) Depression/ 30+ ys Clean and Sober:  intermittently struggles w/ feelings of depression but no suicidal ideations-better past yr and decreased venlaxifine to 75 mg once daily, continues to have strong family support\par Fatigue chronically, worse when SLE/ DM not fully controlled but may also have FLOR (recommend pulm eval but not done despite repeated recommendations.  Should do full sleep study!)\par \par 3) CV risk: high given longstanding heavy smoker, chronic inflammatory state (SLE/DM) and long term use Steroids w/ NEEDS PCP repeatedly advised and not done\par - PreDM w/ HbA1c 5.9.. \par -  HLD:  persistently elevated, , TG  209, HDL  60, VOT252- updated orders now repeat.. \par last year and again 4/17 was supposed to start statin, but did not. .\par Noted plaque formation on carotid studies, echo suggested ischemic change but stress test neg.  Cath was not done and continues to deny palpitations, cp, sob, rosas... despite extensive review subtle sx in females.. still denies.\par - Smoking: Each visit discussed long standing smoking and has no interest in stopping still   \par \par 4) Non compliance:  repeatedly as noted over years... f/u infrequent ov, failed to secure PCP- still - studies not done as requested. \par  Remain concerned about multiple risk factors for CVD, emphasized this today and every visit.. in past also discussed w/  pt and partner... but over time little changes.  \par Admits depression is a factor.. but doesn't feel this is completely the reason and denies depression at this time.  \par \par 5) Osteopenia: last study 3/23/22 w/ most severe T score -1.1 at spine with nl density at hips and low risk for fracture 16% overall and 1.5% risk hip fracture despite recent fragility (no trauma) fx of L1 (recognize that DJD spine alters reading at this level- and spine is NOT part of Frax calculation).  \par No  previous tx. Lengthy discussion today, would recommend BP tx.. literature provided will start next visit \par Risk factors: long term steroid use, smoking\par \par 6) Cervical radiculopathy/ myofascial pain: doing well lately, much better with less stress \par  xrays 2/20 w/ cervical straightening  of cervical spine and if not better w/ steroids/ muscle relaxants will get MRI- all sx better with better\par \par 7) Dog bite: since 7/10/22, not healing till stopped MMF.. now nearly fully resolved.  \par  \par Vaccination: \par - annual flu vaccine annually \par - Pneumonia 23 4 ys ago\par - Prevnar 13 needed\par - Sars-Cov-2 Vaccine completed..excellent antibody response\par \par \par \par Plan:\par \par - remain off MMF.. \par \par - restart Plaquenil (brand only - ISABELA),  mg (5.6 mg/kg initially, if good response will lower to 300 mg daily) not tolerated in past repeatedly tried \par \par - Use  PRN cyclobenzaprine  20  mg daily at HS for severe neck or lower back spasms. \par \par - consider Evusheld monoclonal antibodies for COVID protection- is not interested \par \par - needs tx for bone loss.. would start oral BP and switch to Reclast if tolerated but will address at next visit \par \par - Call if you get COVID and we will also consider giving you anti viral therapy \par \par - again recommend need a primary care:  Call Catalina Clemens  [phone].  If you have trouble getting appt let me know still recommended .. still \par \par - Prevnar 13 next visit!!!  or consider Prevar 20 now that available \par \par - need thyroid US next yr f/u with ENT 3/23 \par \par - recommend continued work from home, disease activity  better controlled then ever!  On lowest dose steroids noted and tolerated in past 10 ys \par \par - rto 3-4 m\par \par - as always, you should consider decreasing cigarette smoking\par \par - continue to lower prednisone no faster than 1 mg every 2 weeks .. but likely need to add another DMARD.. updated derm eval to confirm:  suggests actually ScLE  . Goal ideally 3 mg or less\par \par - ok to continue with 200 mg trazodone\par \par - Make appt with pulm for:  Sleep study  and PFTs need updated and CTscan ...NEEDED NOW! \par  \par \par  1.98

## 2024-06-19 ENCOUNTER — MED ADMIN CHARGE (OUTPATIENT)
Age: 59
End: 2024-06-19

## 2024-06-19 ENCOUNTER — LABORATORY RESULT (OUTPATIENT)
Age: 59
End: 2024-06-19

## 2024-06-19 ENCOUNTER — APPOINTMENT (OUTPATIENT)
Dept: RHEUMATOLOGY | Facility: CLINIC | Age: 59
End: 2024-06-19
Payer: COMMERCIAL

## 2024-06-19 VITALS
HEIGHT: 62.5 IN | BODY MASS INDEX: 28.17 KG/M2 | SYSTOLIC BLOOD PRESSURE: 120 MMHG | TEMPERATURE: 97.6 F | WEIGHT: 157 LBS | DIASTOLIC BLOOD PRESSURE: 70 MMHG | HEART RATE: 80 BPM | OXYGEN SATURATION: 96 %

## 2024-06-19 DIAGNOSIS — M25.512 PAIN IN LEFT SHOULDER: ICD-10-CM

## 2024-06-19 DIAGNOSIS — Z23 ENCOUNTER FOR IMMUNIZATION: ICD-10-CM

## 2024-06-19 DIAGNOSIS — I73.00 RAYNAUD'S SYNDROME W/OUT GANGRENE: ICD-10-CM

## 2024-06-19 DIAGNOSIS — M80.08XA AGE-RELATED OSTEOPOROSIS WITH CURRENT PATHOLOGICAL FRACTURE, VERTEBRA(E), INITIAL ENCOUNTER FOR FRACTURE: ICD-10-CM

## 2024-06-19 DIAGNOSIS — L40.0 PSORIASIS VULGARIS: ICD-10-CM

## 2024-06-19 DIAGNOSIS — M65.30 TRIGGER FINGER, UNSPECIFIED FINGER: ICD-10-CM

## 2024-06-19 DIAGNOSIS — M54.50 LOW BACK PAIN, UNSPECIFIED: ICD-10-CM

## 2024-06-19 DIAGNOSIS — M85.80 OTHER SPECIFIED DISORDERS OF BONE DENSITY AND STRUCTURE, UNSPECIFIED SITE: ICD-10-CM

## 2024-06-19 DIAGNOSIS — M32.9 SYSTEMIC LUPUS ERYTHEMATOSUS, UNSPECIFIED: ICD-10-CM

## 2024-06-19 DIAGNOSIS — M25.551 PAIN IN RIGHT HIP: ICD-10-CM

## 2024-06-19 DIAGNOSIS — M33.13 OTHER DERMATOMYOSITIS WITHOUT MYOPATHY: ICD-10-CM

## 2024-06-19 PROCEDURE — 99214 OFFICE O/P EST MOD 30 MIN: CPT | Mod: 25

## 2024-06-19 PROCEDURE — 90471 IMMUNIZATION ADMIN: CPT

## 2024-06-19 PROCEDURE — G2211 COMPLEX E/M VISIT ADD ON: CPT | Mod: NC,1L

## 2024-06-19 PROCEDURE — 90677 PCV20 VACCINE IM: CPT

## 2024-06-19 RX ORDER — HYDROXYCHLOROQUINE SULFATE 200 MG/1
200 TABLET, FILM COATED ORAL
Qty: 180 | Refills: 1 | Status: ACTIVE | COMMUNITY
Start: 2022-08-08 | End: 1900-01-01

## 2024-06-19 RX ORDER — BUPROPION HYDROCHLORIDE 75 MG/1
75 TABLET, FILM COATED ORAL
Qty: 60 | Refills: 2 | Status: DISCONTINUED | COMMUNITY
Start: 2023-12-06 | End: 2024-06-19

## 2024-06-19 RX ORDER — METOPROLOL TARTRATE 75 MG/1
TABLET, FILM COATED ORAL
Refills: 0 | Status: DISCONTINUED | COMMUNITY
End: 2024-06-19

## 2024-06-19 RX ORDER — VENLAFAXINE HYDROCHLORIDE 75 MG/1
75 CAPSULE, EXTENDED RELEASE ORAL DAILY
Qty: 90 | Refills: 3 | Status: ACTIVE | COMMUNITY
Start: 2024-02-23 | End: 1900-01-01

## 2024-06-19 RX ORDER — MUPIROCIN 20 MG/G
2 OINTMENT TOPICAL 3 TIMES DAILY
Qty: 1 | Refills: 1 | Status: DISCONTINUED | COMMUNITY
Start: 2022-07-13 | End: 2024-06-19

## 2024-06-19 RX ORDER — MOMETASONE FUROATE 1 MG/G
0.1 CREAM TOPICAL TWICE DAILY
Qty: 1 | Refills: 2 | Status: DISCONTINUED | COMMUNITY
Start: 2022-07-27 | End: 2024-06-19

## 2024-06-20 LAB
25(OH)D3 SERPL-MCNC: 88.6 NG/ML
CALCIUM SERPL-MCNC: 9.8 MG/DL
PARATHYROID HORMONE INTACT: 32 PG/ML
PHOSPHATE SERPL-MCNC: 4.2 MG/DL
TSH SERPL-ACNC: 1.61 UIU/ML

## 2024-06-21 ENCOUNTER — APPOINTMENT (OUTPATIENT)
Dept: ORTHOPEDIC SURGERY | Facility: CLINIC | Age: 59
End: 2024-06-21

## 2024-06-21 VITALS
DIASTOLIC BLOOD PRESSURE: 82 MMHG | HEART RATE: 96 BPM | WEIGHT: 157 LBS | SYSTOLIC BLOOD PRESSURE: 121 MMHG | HEIGHT: 62.5 IN | BODY MASS INDEX: 28.17 KG/M2

## 2024-06-21 DIAGNOSIS — R29.898 OTHER SYMPTOMS AND SIGNS INVOLVING THE MUSCULOSKELETAL SYSTEM: ICD-10-CM

## 2024-06-21 DIAGNOSIS — M25.512 PAIN IN LEFT SHOULDER: ICD-10-CM

## 2024-06-21 DIAGNOSIS — M75.90 BURSITIS OF UNSPECIFIED SHOULDER: ICD-10-CM

## 2024-06-21 DIAGNOSIS — M75.50 BURSITIS OF UNSPECIFIED SHOULDER: ICD-10-CM

## 2024-06-21 DIAGNOSIS — M79.643 PAIN IN UNSPECIFIED HAND: ICD-10-CM

## 2024-06-21 DIAGNOSIS — M79.646 PAIN IN UNSPECIFIED HAND: ICD-10-CM

## 2024-06-21 LAB
ALBUMIN MFR SERPL ELPH: 58.8 %
ALBUMIN SERPL-MCNC: 4.3 G/DL
ALBUMIN/GLOB SERPL: 1.4 RATIO
ALPHA1 GLOB MFR SERPL ELPH: 4.5 %
ALPHA1 GLOB SERPL ELPH-MCNC: 0.3 G/DL
ALPHA2 GLOB MFR SERPL ELPH: 11.3 %
ALPHA2 GLOB SERPL ELPH-MCNC: 0.8 G/DL
B-GLOBULIN MFR SERPL ELPH: 11.5 %
B-GLOBULIN SERPL ELPH-MCNC: 0.8 G/DL
GAMMA GLOB FLD ELPH-MCNC: 1 G/DL
GAMMA GLOB MFR SERPL ELPH: 13.9 %
INTERPRETATION SERPL IEP-IMP: NORMAL
PROT SERPL-MCNC: 7.3 G/DL
PROT SERPL-MCNC: 7.3 G/DL

## 2024-06-21 PROCEDURE — 73030 X-RAY EXAM OF SHOULDER: CPT | Mod: LT

## 2024-06-21 PROCEDURE — 73130 X-RAY EXAM OF HAND: CPT | Mod: 50

## 2024-06-21 PROCEDURE — 20610 DRAIN/INJ JOINT/BURSA W/O US: CPT | Mod: LT

## 2024-06-21 PROCEDURE — 99215 OFFICE O/P EST HI 40 MIN: CPT | Mod: 25

## 2024-06-21 PROCEDURE — 20550 NJX 1 TENDON SHEATH/LIGAMENT: CPT | Mod: 59,RT

## 2024-06-21 NOTE — PHYSICAL EXAM
[de-identified] : Examination of the left shoulder reveals equal active and passive motion as compared to the contralateral side There is a positive Speed, positive Sandoval, positive Gonzáles, tenderness with anterior shoulder compression. 3+/5 strength  Examination of the hand(s)  particularly at the A1 of the right ring reveals tenderness with a palpable click. [de-identified] : [4] views of [left] shoulder were obtained today in my office and were seen by me and discussed with the patient.  These [show findings consistent with AC arthropathy and grossly preserved glenohumeral joint space] I see lateral calcification  [4] views of [bilateral hands and wrists] were obtained today in my office and were seen by me and discussed with the patient.  These [show findings consistent with bilateral basal joint OA and findings of IP joint OA]

## 2024-06-21 NOTE — ASSESSMENT
[FreeTextEntry1] : ASSESSMENT: The patient comes in today with chronic worsening symptoms of left shoulder tendinopathy impingement weakness bursitis.  For this we have discussed treatment modalities including physical therapy.  She elects for an injection as well. We have discussed the right hand discomfort with significant tendinopathy and stiffness for this she elects for an injection as well She does have a chronic history of lupus   The patient was adequately and thoroughly informed of my assessment of their current condition(s).  - This may diminish bodily function for the extremity. We discussed prognosis, tx modalities including operative and nonoperative options for the above diagnostic assessment. As always, 2nd opinion is always provided as an option.  When accessible, I was able to review other physicians note(s) including reviewing other tests, imaging results as well as personally view these results for my own interpretation.   Left SUBACROMIAL SHOULDER INJECTION   Indication:  subacromial bursitis/impingement, pain   Risk, benefits and alternatives were discussed with the patient. Potential complications include bleeding and infection. Alcohol was used to prep the area.  Ethyl chloride spray was used as a topical anesthetic.  Using sterile technique, the injection needle was then directed from a standard posterior approach parallel to and inferior to the acromion. A 21g needle was used to inject 5 mL of 1% Lidocaine and 1 unit 10mg Kenalog.  No significant resistance was encountered.  A bandage was applied.  The patient tolerated the procedure well.    Patient instructed to avoid strenuous activity for 2 day(s). Specifically counseled regarding the signs and symptoms of potential infection and instructed to present promptly to clinic or hospital if such signs and symptoms arise.  Injection:   The risks and benefits of a steroid injection were discussed in detail. The risks include but are not limited to: pain, infection, swelling, flare response, bleeding, subcutaneous fat atrophy, skin depigmentation and/or elevation of blood sugar. The risk of incomplete resolution of symptoms, recurrence and additional intervention was reviewed and considered by the patient. The patient agreed to proceed and under a sterile prep, I injected 1 unit 6mg into 1 cc of a combination of Celestone and Lidocaine into the right ring A1. The patient tolerated the injection well.  The patient was adequately and thoroughly informed of my assessment of their current condition(s).  DISCUSSION: 1.  Left shoulder and right hand injections as above.  PT prescription provided.  Follow-up 2 months 2. [x] 3. [x]

## 2024-06-21 NOTE — HISTORY OF PRESENT ILLNESS
[FreeTextEntry1] : Yas is a very pleasant 59-year-old female who presents today with a chronic history of persistent worsening left shoulder and right hand discomfort difficulty with overhead motion.  Chronic history of lupus she states.  Denies injury

## 2024-06-23 PROBLEM — M65.30 TRIGGER FINGER, ACQUIRED: Status: ACTIVE | Noted: 2024-06-21

## 2024-06-23 NOTE — PROCEDURE
[Today's Date:] : Date: [unfilled] [Therapeutic] : therapeutic [#1 Site: ______] : #1 site identified in the [unfilled] [25 gauge 5/8  inch] : A 25 gauge 5/8 inch needle was used [Tolerated Well] : the patient tolerated the procedure well [No Complications] : there were no complications [Instructions Given] : handouts/patient instructions were given to patient [Patient Instructed to Call] : patient was instructed to call if redness at site, a decrease in range of motion or an increase in pain is noted after procedure. [Alcohol] : alcohol [Risks] : risks [Benefits] : benefits [Consent Obtained] : written consent was obtained prior to the procedure and is detailed in the patient's record [Patient] : Prior to the start of the procedure a time out was taken and the identity of the patient was confirmed via name and date of birth with the patient. The correct site and the procedure to be performed were confirmed. The correct side was confirmed if applicable. The availability of the correct equipment was verified [de-identified] : Prevnar 20 [FreeTextEntry1] :  Monitor for flulike sx or allergic reaction, take APAP as needed first 3 days, if flu like symptoms persist call office

## 2024-06-23 NOTE — REVIEW OF SYSTEMS
[Red Eyes] : red eyes [Dry Eyes] : dryness of the eyes [Hoarseness] : hoarseness [Arthralgias] : arthralgias [Joint Pain] : joint pain [Skin Lesions] : skin lesion [Sleep Disturbances] : sleep disturbances [Depression] : depression [Negative] : Heme/Lymph [Feeling Tired] : feeling tired [As Noted in HPI] : as noted in HPI [Feeling Poorly] : not feeling poorly [Eye Pain] : no eye pain [Wheezing] : no wheezing [Cough] : no cough [SOB on Exertion] : no shortness of breath during exertion [Heartburn] : no heartburn [FreeTextEntry2] : fatigue - chronic but has not completed sleep study.  Quit smoking 10/17/23 significant wt increase, resumed smoking 02/2024 pack daily now 157 lbs [FreeTextEntry3] : stable - no visual disturbance. Completed eye exam last week of 06/10/24 stable continue plaquenil [FreeTextEntry4] : stable  [FreeTextEntry6] : 2 m+ dry non productive cough- wheezing and now occas CLOUD - RESOLVED [FreeTextEntry7] : improved nausea since stopping MMF  [FreeTextEntry9] : Worsening pain reports R hip, back, L shoulder, L foot. Previously hand pain and L arm, L lateral hip greatly improved now; back pain greatly improved [de-identified] :  Facial peeling, subacute cutaneous lesion on underside of R arm- and hands/ forearms all considerably better.  Hyperpigmentation at site of wound from dog bite 7/10/22- but fully healed  [de-identified] : R hand weakness triggering and locking w/o diffuse weakness [de-identified] : Patient feels she sleeps well but reports chronic NRS and fatigue. Trouble sleeping without stable dose trazodone- better now on higher dose ... tapered down venlaxifine by 50% and doing well.

## 2024-06-23 NOTE — PHYSICAL EXAM
[General Appearance - Alert] : alert [General Appearance - In No Acute Distress] : in no acute distress [General Appearance - Well Nourished] : well nourished [General Appearance - Well Developed] : well developed [General Appearance - Well-Appearing] : healthy appearing [Outer Ear] : the ears and nose were normal in appearance [] : no respiratory distress [Respiration, Rhythm And Depth] : normal respiratory rhythm and effort [Auscultation Breath Sounds / Voice Sounds] : lungs were clear to auscultation bilaterally [Heart Rate And Rhythm] : heart rate was normal and rhythm regular [Heart Sounds] : normal S1 and S2 [Heart Sounds Gallop] : no gallops [Murmurs] : no murmurs [Heart Sounds Pericardial Friction Rub] : no pericardial rub [Full Pulse] : the pedal pulses are present [Edema] : there was no peripheral edema [No CVA Tenderness] : no ~M costovertebral angle tenderness [No Spinal Tenderness] : no spinal tenderness [Motor Exam] : the motor exam was normal [No Focal Deficits] : no focal deficits [Oriented To Time, Place, And Person] : oriented to person, place, and time [Impaired Insight] : insight and judgment were intact [Affect] : the affect was normal [Abnormal Walk] : normal gait [Nail Clubbing] : no clubbing  or cyanosis of the fingernails [Motor Tone] : muscle strength and tone were normal [Cervical Lymph Nodes Enlarged Posterior Bilaterally] : posterior cervical [Cervical Lymph Nodes Enlarged Anterior Bilaterally] : anterior cervical [Supraclavicular Lymph Nodes Enlarged Bilaterally] : supraclavicular [FreeTextEntry1] : Limited ROM L shoulder forward extension and abduction, passively fROM w/ crepitus on abduction;  R3/4 flexor tendon nodule triggering without locking today; nodule of L plantar tendon ttp.  Previously several PIP boggy 2,3,5 R and 2-4 PIP L as well as mild ttt and fullness L knee- RESOLVED; no obvious fluid shift in any joint.. no warmth/ redness; R lateral hip NT

## 2024-06-23 NOTE — ADDENDUM
[FreeTextEntry1] : 06/21/24 Ortho Visit Dr. Jean Baptiste:  XR L shoulder : show findings consistent with AC arthropathy and grossly preserved glenohumeral joint space. I see lateral calcification  XR b/l hands and wrists: show findings consistent with bilateral basal joint OA and findings of IP joint OA  Dx L SAB and impingement and R hand tendinopathy steroid injections, referred for L shoulder PT  f/u 2 months

## 2024-06-23 NOTE — HISTORY OF PRESENT ILLNESS
[FreeTextEntry1] : 06/19/24 -Patient is not feeling well today reports increased pain -Discontinued prednisone September 2023 -CC: R GTB, trigger finger R4 locking 3 months ago, back, shoulder L reduced range of motion and POM started approx last 6 months, back fx L1, L foot nodule painful when walking on it intermediate -Takes meloxicam 15mg PRN, takes as preventative on weekend when she knows she's going to do alot of activity -R hand weakness but otherwise no diffuse weakness, rashes R anterior arm disappearing and now returning -Has not completed sleep study - has uninterrupted sleep but routinely NRS x many ys -Taking Plaquenil 400mg tolerating well, had eye exam 06/11/24 per pt continue plaquenil -Patient still working from home but has difficulty with prolonged sitting standing causes pain R hip and back difficulty concentrating -Quit smoking 10/17/23 restarted 02/24 1-pack daily. Patches from [facility]. Using The Easy Way to Stop Smoking by Mariusz Valero and nicotine patches -2 stents Cardiology Dr. Alarcon no MI- no active CP recently  -Has not completed updated CT last recommendation 09/2023 f/u 3-6 months  - not taking anything for bone loss.. no recent fractures.  Never started BP therapy.. too anxious about SE   -Recent labs 06/11/24: neg/nl UA, CBC, CMP, ESR, CRP, C3/4, DSDNA  [NOTE: was on Wellbutrin in the past and reports she suddenly stopped it for some reason and she ended up in the psych hay, she believes it was due to her suddenly stopping the medication and not the medication itself], feels like she tolerated the medicine without issues in the past   1) SLE/ DM sine myositis: high titer MELLY, dsDNA+ (intermittently) , + RNP (repeat neg), SSA+ > 8    2) Depression/ 27 ys Clean and Sober:   3) Health mnt: Smoking- active > 1 PPD x 30+ ys  HLD:  persistently elevated  tchol 274 Trigy  209 HDL  60 [id number] last year and again 4/17 was supposed to start statin, but did not.

## 2024-06-23 NOTE — ASSESSMENT
[FreeTextEntry1] : 59 yo wf with longstanding SLE/ DM sine myositis, HLD/ CAD s/p STENTS, Depression, recovering ETOH and recently dx w/ intraductal papilloma R breast s/p lumpectomy- no additional tx needed. Presents today for scheduled f/u SLE - bx confirmed SLE / not DM recently 9/22  1) SLE/ DM sine myositis: high titer MELLY, + SSA / B > 8, dsDNA+ (intermittently) , + RNP in past, recent HCQ levels within therapeutic range - Bx + for SLE- ? discoid and/ or subacute cutaneous several new lesions. 2 different bx c/w SLE.. less likely DM.. recommend following tx for SLE to treat. Since then stopped MMF and restarted HCQ at 400 mg (72 kg= 5.5 mg/kg) and skin markedly better than it has been for years. Consistently +response to steroid. Still again may need to consider add-on therapy given prominent skin lesions might consider Saphnelo..  But previous discussions she has declined repeatedly  CTscan chest 9/22 -> 9/23 stable mild emphysema and stable peripheral interstitial markings - no new or suspicious findings multiple subcentimeter reactive nodes or suspicious mass or adenopathy otherwise.  PFTs completed 02/24 w/o DLCO followed by Dr. Gonzalez per pulm "spirometry remains normal, no sig change 12/22" Continues on Plaquenil well tolerated, off steroid, with re-emergence RUE anterior lesion - previously fully resolved-. Last visit derm 07/22, last eye exam 06/11/24 requested last note ophtho Recent labs 06/11/24: neg/nl UA, CBC, CMP, ESR, CRP, C3/4, DSDNA stable not suggestive of systemic inflammation  No myositis now or in past - no new rash but return of RUE lesions, proximal neck weakness w/ flexion but otherwise weakness RLE and R hand likely structural (see below) No changes to tx recommended at this time- though still suggest starting Saphnelo . NOTE: - elixer MMF not better tolerated, nausea at times considerable on any dose - Failed to control w/ HCQ alone on low dose steroids x many ys, added Benlysta, tapered off steroids but after 1 yr, refused to continue- - 2014 seen by [facility] for derm eval dx facial rash as DM but no bx. - skin bx ys ago Dr. Perea + SLE no details available many ys ago  2) Depression/ 30+ ys Clean and Sober: intermittently struggles w/ feelings of depression but no suicidal ideations-better past yr and continues 75 mg venlaxifine and doing fairly well.. having decreased dose from high levels in past. Continues to have strong family support currently dealing with a mother who is diagnosed with lung cancer going through chemotherapy and very stressed with the caregiving responsibilities Fatigue chronically, worse now following smoking cessation but may also have FLOR (recommend pulm eval but not done despite repeated recommendations. Should do full sleep study! and doesn't feel this is necessary -still 06/24). Frustrated with 22 pound weight gain over the past year - wt loss again w/ increase in smoking (pt plan for cessation below). Is aware of need to call immediately if depression worsens after starting or increasing dose Has continued trazodone 200 mg long term with + response sleep helps with NRS.. though still active issue  NOTE:  - Bupropion ys ago suddenly stopped and experienced exacerbation of depression-> causing hospitalization.  Does NOT feel it was due to the medication itself but stopping suddenly.   3) CV risk: high given longstanding heavy smoker, chronic inflammatory state (SLE/DM) and long term use Steroids w/ NEEDS PCP repeatedly advised still struggling to find someone convenient. Followed closely by cardiology Dr. Alarcon last visit 05/24, note appreciated - PreDM w/ HbA1c 5.9..(when was last study?)  - CAD: s/p Stents 01/23 & 02/23 following currently on Plavix 75mg and ASA 81 mg - card note states pt d/c plavix - HLD: persistently elevated, , , HDL 60, ESE117- now on Rosuvastatin 10 mg. No recent lipid profile - Smoking: Resumed smoking 02/24 pack daily and trying to decrease daily intake. Plans to use book "Easy Way to Stop Smoking" and nicotine patches to quit as previously 10/23. Not interested in bupropion rx Recommendation for short-term repeat CT chest not completed - ordered Still needs PCP.. again  4) Non compliance: repeatedly as noted over years... f/u infrequent ov, failed to secure PCP- still - studies not done as requested. Remain concerned about multiple risk factors for CVD and now STENTs.. is following with cardiology routinely. , Admits depression is a factor.. but doesn't feel this is completely the reason and denies depression at this time, reports taking venlafaxine 75mg daily.  5) Osteopenia: last study 3/23/22 w/ most severe T score -1.1 at spine with nl density at hips and low risk for fracture 16% overall and 1.5% risk hip fracture despite recent fragility (no trauma) fx of L1 (recognize that DJD spine alters reading at this level- and spine is NOT part of Frax calculation). No previous tx. Lengthy discussion repeatedly, would recommend BP tx.. literature provided advised to start but not done.. not willing to consider  Risk factors: long term steroid use, smoking - still Now requires updated DEXA and OP labs to determine appropriate tx options  6) Regional Pain: - CS radiculopathy/myofascial pain: Neck and myofascial pain stable not active however weakness flexion against resistance.  XR 2/20 w/ cervical straightening of cervical spine and if not better w/ steroids/ muscle relaxants will get MRI- all sx better with better - LS radiculopathy: Hx L1 fx. RLE weakness and R hip pain w/o paresthesia. Ordered PT will considered updated MR LS if not improvement 3/23 MR R hip moderate gluteus minimus and medius tendinosis w/out tears, DJD LS but JS well preserved at hip, nl SI joints, no evidence of AVN, no effusion - L shoulder: new c/o L shoulder pain w/o paresthesia or weakness, rROM. Referred for PT, XR shoulder ordered - R hand; triggering and locking R3/4 reduced  strength. Referred to hand surgery for further evaluation and intervention - L foot: pain exacerbated by walking intermittently, palpable planter tendon nodule. Not limiting activity.  Site CTD inflammation?  Vaccination: - annual flu vaccine annually needed (high dose) - Pneumonia 23 4 ys ago - Prevnar 20 completed 06/19/24 - Sars-Cov-2 Vaccine completed..excellent antibody response  PLAN 06/19/24: -Labs completed today - OP  -Prioritize smoking cessation. Contact provider if interested in retrial wellbutrin or referral smoking cessation program  - Continue  mg daily - requested last note ophtho Remain off steroids but may need to consider saphnelo, but has not been willing to consider   - recommend continued work from home  -Prevnar 20 L deltoid IM administered today  -Updated DEXA ordered Call if fx prior to next visit  -Updated CT chest for short-term f/u. Need updated PFTs?  -Ordered PT on the L shoulder and R hip Xray L shoulder  -Start routine exercise  -Hand surgery for trigger finger - entered [facility] cares  -TTM in 2 weeks review labs/imaging  - continue venlafaxine 75 mg for now  -Renewed and continue trazodone 200 mg at HS. Still needs SLEEP Study  - Call if you get COVID and we will also consider giving you anti viral therapy   - NEEDS PCP and routine lipids, A1c..   -RPA 4 months  Patient was seen and evaluated by Zayda Louie DNP (training in rheumatology) and with JOSHUA Oneil I agree with full evaluation and plan as described above.

## 2024-06-26 LAB — COLLAGEN CTX SERPL-MCNC: 347 PG/ML

## 2024-07-03 ENCOUNTER — APPOINTMENT (OUTPATIENT)
Dept: RHEUMATOLOGY | Facility: CLINIC | Age: 59
End: 2024-07-03
Payer: COMMERCIAL

## 2024-07-03 PROCEDURE — 99442: CPT

## 2024-08-22 ENCOUNTER — APPOINTMENT (OUTPATIENT)
Dept: ORTHOPEDIC SURGERY | Facility: CLINIC | Age: 59
End: 2024-08-22

## 2024-10-14 ENCOUNTER — RX RENEWAL (OUTPATIENT)
Age: 59
End: 2024-10-14

## 2024-11-11 NOTE — ASU PATIENT PROFILE, ADULT - TOBACCO USE
Airway  Urgency: elective    Date/Time: 11/11/2024 10:15 AM  Airway not difficult    General Information and Staff    Patient location during procedure: OR  CRNA/CAA: Dani Ricardo CRNA    Indications and Patient Condition  Indications for airway management: airway protection    Preoxygenated: yes  MILS not maintained throughout  Mask difficulty assessment: 1 - vent by mask    Final Airway Details  Final airway type: endotracheal airway      Successful airway: ETT  Cuffed: yes   Successful intubation technique: direct laryngoscopy  Endotracheal tube insertion site: oral  Blade: Filomena  Blade size: 3  ETT size (mm): 7.0  Cormack-Lehane Classification: grade I - full view of glottis  Placement verified by: chest auscultation and capnometry   Measured from: lips  ETT/EBT  to lips (cm): 20  Number of attempts at approach: 1  Assessment: lips, teeth, and gum same as pre-op and atraumatic intubation    Additional Comments  Negative epigastric sounds, Breath sound equal bilaterally with symmetric chest rise and fall           Current some day smoker

## 2024-12-13 ENCOUNTER — TRANSCRIPTION ENCOUNTER (OUTPATIENT)
Age: 59
End: 2024-12-13

## 2024-12-16 ENCOUNTER — TRANSCRIPTION ENCOUNTER (OUTPATIENT)
Age: 59
End: 2024-12-16

## 2024-12-18 ENCOUNTER — TRANSCRIPTION ENCOUNTER (OUTPATIENT)
Age: 59
End: 2024-12-18

## 2024-12-19 ENCOUNTER — TRANSCRIPTION ENCOUNTER (OUTPATIENT)
Age: 59
End: 2024-12-19

## 2024-12-23 ENCOUNTER — TRANSCRIPTION ENCOUNTER (OUTPATIENT)
Age: 59
End: 2024-12-23

## 2024-12-26 ENCOUNTER — APPOINTMENT (OUTPATIENT)
Dept: PULMONOLOGY | Facility: CLINIC | Age: 59
End: 2024-12-26

## 2025-01-07 ENCOUNTER — RX RENEWAL (OUTPATIENT)
Age: 60
End: 2025-01-07

## 2025-01-09 ENCOUNTER — TRANSCRIPTION ENCOUNTER (OUTPATIENT)
Age: 60
End: 2025-01-09

## 2025-01-22 ENCOUNTER — TRANSCRIPTION ENCOUNTER (OUTPATIENT)
Age: 60
End: 2025-01-22

## 2025-01-24 ENCOUNTER — APPOINTMENT (OUTPATIENT)
Dept: PULMONOLOGY | Facility: CLINIC | Age: 60
End: 2025-01-24
Payer: COMMERCIAL

## 2025-01-24 VITALS
BODY MASS INDEX: 26.91 KG/M2 | WEIGHT: 150 LBS | OXYGEN SATURATION: 96 % | DIASTOLIC BLOOD PRESSURE: 74 MMHG | RESPIRATION RATE: 16 BRPM | SYSTOLIC BLOOD PRESSURE: 130 MMHG | HEIGHT: 62.5 IN | HEART RATE: 102 BPM

## 2025-01-24 DIAGNOSIS — F17.219 NICOTINE DEPENDENCE, CIGARETTES, WITH UNSPECIFIED NICOTINE-INDUCED DISORDERS: ICD-10-CM

## 2025-01-24 DIAGNOSIS — J44.9 CHRONIC OBSTRUCTIVE PULMONARY DISEASE, UNSPECIFIED: ICD-10-CM

## 2025-01-24 PROCEDURE — G2211 COMPLEX E/M VISIT ADD ON: CPT | Mod: NC

## 2025-01-24 PROCEDURE — 99215 OFFICE O/P EST HI 40 MIN: CPT | Mod: 25

## 2025-01-24 PROCEDURE — 99406 BEHAV CHNG SMOKING 3-10 MIN: CPT

## 2025-02-03 ENCOUNTER — APPOINTMENT (OUTPATIENT)
Dept: PULMONOLOGY | Facility: CLINIC | Age: 60
End: 2025-02-03
Payer: COMMERCIAL

## 2025-02-03 ENCOUNTER — NON-APPOINTMENT (OUTPATIENT)
Age: 60
End: 2025-02-03

## 2025-02-03 VITALS — WEIGHT: 146 LBS | BODY MASS INDEX: 26.19 KG/M2 | HEIGHT: 62.5 IN

## 2025-02-03 DIAGNOSIS — J44.9 CHRONIC OBSTRUCTIVE PULMONARY DISEASE, UNSPECIFIED: ICD-10-CM

## 2025-02-03 PROCEDURE — 94010 BREATHING CAPACITY TEST: CPT

## 2025-02-03 RX ORDER — FLUTICASONE FUROATE, UMECLIDINIUM BROMIDE AND VILANTEROL TRIFENATATE 100; 62.5; 25 UG/1; UG/1; UG/1
100-62.5-25 POWDER RESPIRATORY (INHALATION)
Qty: 1 | Refills: 5 | Status: ACTIVE | COMMUNITY
Start: 2025-02-03 | End: 1900-01-01

## 2025-02-03 RX ORDER — IPRATROPIUM BROMIDE AND ALBUTEROL SULFATE 2.5; .5 MG/3ML; MG/3ML
0.5-2.5 (3) SOLUTION RESPIRATORY (INHALATION) 4 TIMES DAILY
Qty: 1080 | Refills: 3 | Status: ACTIVE | COMMUNITY
Start: 2025-02-03 | End: 1900-01-01

## 2025-02-06 ENCOUNTER — NON-APPOINTMENT (OUTPATIENT)
Age: 60
End: 2025-02-06

## 2025-02-11 ENCOUNTER — APPOINTMENT (OUTPATIENT)
Dept: RHEUMATOLOGY | Facility: CLINIC | Age: 60
End: 2025-02-11
Payer: COMMERCIAL

## 2025-02-11 VITALS
SYSTOLIC BLOOD PRESSURE: 126 MMHG | HEIGHT: 62.5 IN | TEMPERATURE: 97.6 F | BODY MASS INDEX: 27.45 KG/M2 | HEART RATE: 87 BPM | OXYGEN SATURATION: 97 % | DIASTOLIC BLOOD PRESSURE: 74 MMHG | WEIGHT: 153 LBS

## 2025-02-11 DIAGNOSIS — M80.08XA AGE-RELATED OSTEOPOROSIS WITH CURRENT PATHOLOGICAL FRACTURE, VERTEBRA(E), INITIAL ENCOUNTER FOR FRACTURE: ICD-10-CM

## 2025-02-11 PROCEDURE — 99214 OFFICE O/P EST MOD 30 MIN: CPT

## 2025-02-11 PROCEDURE — G2211 COMPLEX E/M VISIT ADD ON: CPT | Mod: NC

## 2025-02-11 RX ORDER — RISEDRONATE SODIUM 150 MG/1
150 TABLET, FILM COATED ORAL
Qty: 3 | Refills: 3 | Status: ACTIVE | COMMUNITY
Start: 2025-02-11 | End: 1900-01-01

## 2025-02-12 ENCOUNTER — TRANSCRIPTION ENCOUNTER (OUTPATIENT)
Age: 60
End: 2025-02-12

## 2025-02-12 RX ORDER — DOXYCYCLINE HYCLATE 100 MG/1
100 TABLET ORAL
Qty: 10 | Refills: 3 | Status: ACTIVE | COMMUNITY
Start: 2025-02-12 | End: 1900-01-01

## 2025-03-26 ENCOUNTER — APPOINTMENT (OUTPATIENT)
Dept: PULMONOLOGY | Facility: CLINIC | Age: 60
End: 2025-03-26
Payer: COMMERCIAL

## 2025-03-26 VITALS
WEIGHT: 149 LBS | OXYGEN SATURATION: 97 % | RESPIRATION RATE: 16 BRPM | DIASTOLIC BLOOD PRESSURE: 80 MMHG | HEIGHT: 62 IN | BODY MASS INDEX: 27.42 KG/M2 | SYSTOLIC BLOOD PRESSURE: 126 MMHG | HEART RATE: 94 BPM

## 2025-03-26 DIAGNOSIS — J44.9 CHRONIC OBSTRUCTIVE PULMONARY DISEASE, UNSPECIFIED: ICD-10-CM

## 2025-03-26 DIAGNOSIS — J98.4 OTHER DISORDERS OF LUNG: ICD-10-CM

## 2025-03-26 DIAGNOSIS — R91.8 OTHER NONSPECIFIC ABNORMAL FINDING OF LUNG FIELD: ICD-10-CM

## 2025-03-26 DIAGNOSIS — F17.219 NICOTINE DEPENDENCE, CIGARETTES, WITH UNSPECIFIED NICOTINE-INDUCED DISORDERS: ICD-10-CM

## 2025-03-26 DIAGNOSIS — J45.909 UNSPECIFIED ASTHMA, UNCOMPLICATED: ICD-10-CM

## 2025-03-26 PROCEDURE — 99406 BEHAV CHNG SMOKING 3-10 MIN: CPT

## 2025-03-26 PROCEDURE — G2211 COMPLEX E/M VISIT ADD ON: CPT | Mod: NC

## 2025-03-26 PROCEDURE — 99213 OFFICE O/P EST LOW 20 MIN: CPT | Mod: 25

## 2025-03-26 RX ORDER — HYDROXYCHLOROQUINE SULFATE 100 MG/1
100 TABLET ORAL
Refills: 0 | Status: ACTIVE | COMMUNITY

## 2025-03-26 RX ORDER — PREDNISONE 10 MG/1
10 TABLET ORAL
Qty: 30 | Refills: 4 | Status: ACTIVE | COMMUNITY
Start: 2025-03-26 | End: 1900-01-01

## 2025-05-14 ENCOUNTER — APPOINTMENT (OUTPATIENT)
Dept: RHEUMATOLOGY | Facility: CLINIC | Age: 60
End: 2025-05-14

## 2025-05-20 ENCOUNTER — TRANSCRIPTION ENCOUNTER (OUTPATIENT)
Age: 60
End: 2025-05-20

## 2025-06-24 ENCOUNTER — RX RENEWAL (OUTPATIENT)
Age: 60
End: 2025-06-24

## 2025-07-16 ENCOUNTER — RX RENEWAL (OUTPATIENT)
Age: 60
End: 2025-07-16

## 2025-07-29 ENCOUNTER — APPOINTMENT (OUTPATIENT)
Dept: PULMONOLOGY | Facility: CLINIC | Age: 60
End: 2025-07-29

## 2025-08-01 ENCOUNTER — RX RENEWAL (OUTPATIENT)
Age: 60
End: 2025-08-01

## 2025-09-09 ENCOUNTER — RX RENEWAL (OUTPATIENT)
Age: 60
End: 2025-09-09

## 2025-09-11 ENCOUNTER — TRANSCRIPTION ENCOUNTER (OUTPATIENT)
Age: 60
End: 2025-09-11

## 2025-09-12 ENCOUNTER — APPOINTMENT (OUTPATIENT)
Dept: PULMONOLOGY | Facility: CLINIC | Age: 60
End: 2025-09-12